# Patient Record
Sex: MALE | Race: WHITE
[De-identification: names, ages, dates, MRNs, and addresses within clinical notes are randomized per-mention and may not be internally consistent; named-entity substitution may affect disease eponyms.]

---

## 2017-03-19 ENCOUNTER — HOSPITAL ENCOUNTER (INPATIENT)
Dept: HOSPITAL 47 - EC | Age: 82
LOS: 2 days | Discharge: HOME | DRG: 69 | End: 2017-03-21
Payer: MEDICARE

## 2017-03-19 VITALS — BODY MASS INDEX: 32.8 KG/M2

## 2017-03-19 DIAGNOSIS — Z88.2: ICD-10-CM

## 2017-03-19 DIAGNOSIS — I71.4: ICD-10-CM

## 2017-03-19 DIAGNOSIS — Z79.01: ICD-10-CM

## 2017-03-19 DIAGNOSIS — E78.5: ICD-10-CM

## 2017-03-19 DIAGNOSIS — M19.90: ICD-10-CM

## 2017-03-19 DIAGNOSIS — Z87.891: ICD-10-CM

## 2017-03-19 DIAGNOSIS — F41.9: ICD-10-CM

## 2017-03-19 DIAGNOSIS — I10: ICD-10-CM

## 2017-03-19 DIAGNOSIS — Z79.899: ICD-10-CM

## 2017-03-19 DIAGNOSIS — Z82.49: ICD-10-CM

## 2017-03-19 DIAGNOSIS — H91.92: ICD-10-CM

## 2017-03-19 DIAGNOSIS — F03.90: ICD-10-CM

## 2017-03-19 DIAGNOSIS — Z86.73: ICD-10-CM

## 2017-03-19 DIAGNOSIS — I48.0: ICD-10-CM

## 2017-03-19 DIAGNOSIS — Z96.653: ICD-10-CM

## 2017-03-19 DIAGNOSIS — I44.0: ICD-10-CM

## 2017-03-19 DIAGNOSIS — G45.9: Primary | ICD-10-CM

## 2017-03-19 DIAGNOSIS — I25.10: ICD-10-CM

## 2017-03-19 DIAGNOSIS — Z79.82: ICD-10-CM

## 2017-03-19 DIAGNOSIS — R26.9: ICD-10-CM

## 2017-03-19 LAB
ALP SERPL-CCNC: 82 U/L (ref 38–126)
ALT SERPL-CCNC: 22 U/L (ref 21–72)
ANION GAP SERPL CALC-SCNC: 10 MMOL/L
APTT BLD: 28.8 SEC (ref 22–30)
AST SERPL-CCNC: 20 U/L (ref 17–59)
BUN SERPL-SCNC: 20 MG/DL (ref 9–20)
CALCIUM SPEC-MCNC: 9.4 MG/DL (ref 8.4–10.2)
CELLS COUNTED: 100
CH: 33.4
CHCM: 33.7
CHLORIDE SERPL-SCNC: 102 MMOL/L (ref 98–107)
CK SERPL-CCNC: 60 U/L (ref 55–170)
CO2 SERPL-SCNC: 30 MMOL/L (ref 22–30)
ERYTHROCYTE [DISTWIDTH] IN BLOOD BY AUTOMATED COUNT: 4.13 M/UL (ref 4.3–5.9)
ERYTHROCYTE [DISTWIDTH] IN BLOOD: 13.2 % (ref 11.5–15.5)
GLUCOSE SERPL-MCNC: 90 MG/DL (ref 74–99)
HCT VFR BLD AUTO: 41.1 % (ref 39–53)
HDW: 2.26
HGB BLD-MCNC: 13.8 GM/DL (ref 13–17.5)
INR PPP: 2.2 (ref ?–1.1)
MAGNESIUM SPEC-SCNC: 1.9 MG/DL (ref 1.6–2.3)
MCH RBC QN AUTO: 33.5 PG (ref 25–35)
MCHC RBC AUTO-ENTMCNC: 33.6 G/DL (ref 31–37)
MCV RBC AUTO: 99.5 FL (ref 80–100)
NON-AFRICAN AMERICAN GFR(MDRD): >60
POTASSIUM SERPL-SCNC: 3.8 MMOL/L (ref 3.5–5.1)
POTASSIUM SERPL-SCNC: 3.9 MMOL/L (ref 3.5–5.1)
PROT SERPL-MCNC: 7 G/DL (ref 6.3–8.2)
PT BLD: 21.3 SEC (ref 9–12)
SODIUM SERPL-SCNC: 142 MMOL/L (ref 137–145)
TROPONIN I SERPL-MCNC: <0.012 NG/ML (ref 0–0.03)
WBC # BLD AUTO: 5.9 K/UL (ref 3.8–10.6)
WBC (PEROX): 5.98

## 2017-03-19 PROCEDURE — 96360 HYDRATION IV INFUSION INIT: CPT

## 2017-03-19 PROCEDURE — 83735 ASSAY OF MAGNESIUM: CPT

## 2017-03-19 PROCEDURE — 93005 ELECTROCARDIOGRAM TRACING: CPT

## 2017-03-19 PROCEDURE — 83090 ASSAY OF HOMOCYSTEINE: CPT

## 2017-03-19 PROCEDURE — 82553 CREATINE MB FRACTION: CPT

## 2017-03-19 PROCEDURE — 96361 HYDRATE IV INFUSION ADD-ON: CPT

## 2017-03-19 PROCEDURE — 82550 ASSAY OF CK (CPK): CPT

## 2017-03-19 PROCEDURE — 93880 EXTRACRANIAL BILAT STUDY: CPT

## 2017-03-19 PROCEDURE — 84132 ASSAY OF SERUM POTASSIUM: CPT

## 2017-03-19 PROCEDURE — 85610 PROTHROMBIN TIME: CPT

## 2017-03-19 PROCEDURE — 84443 ASSAY THYROID STIM HORMONE: CPT

## 2017-03-19 PROCEDURE — 70450 CT HEAD/BRAIN W/O DYE: CPT

## 2017-03-19 PROCEDURE — 85730 THROMBOPLASTIN TIME PARTIAL: CPT

## 2017-03-19 PROCEDURE — 93306 TTE W/DOPPLER COMPLETE: CPT

## 2017-03-19 PROCEDURE — 71020: CPT

## 2017-03-19 PROCEDURE — 99285 EMERGENCY DEPT VISIT HI MDM: CPT

## 2017-03-19 PROCEDURE — 85025 COMPLETE CBC W/AUTO DIFF WBC: CPT

## 2017-03-19 PROCEDURE — 84484 ASSAY OF TROPONIN QUANT: CPT

## 2017-03-19 PROCEDURE — 36415 COLL VENOUS BLD VENIPUNCTURE: CPT

## 2017-03-19 PROCEDURE — 80053 COMPREHEN METABOLIC PANEL: CPT

## 2017-03-19 PROCEDURE — 80061 LIPID PANEL: CPT

## 2017-03-19 RX ADMIN — WARFARIN SODIUM SCH MG: 5 TABLET ORAL at 21:01

## 2017-03-19 RX ADMIN — CEFAZOLIN SCH MLS/HR: 330 INJECTION, POWDER, FOR SOLUTION INTRAMUSCULAR; INTRAVENOUS at 21:01

## 2017-03-19 RX ADMIN — ATORVASTATIN CALCIUM SCH MG: 20 TABLET, FILM COATED ORAL at 21:01

## 2017-03-19 RX ADMIN — CEFAZOLIN SCH: 330 INJECTION, POWDER, FOR SOLUTION INTRAMUSCULAR; INTRAVENOUS at 15:15

## 2017-03-19 NOTE — US
EXAMINATION TYPE: US carotid duplex BILAT

 

DATE OF EXAM: 3/19/2017 11:49 AM

 

COMPARISON: 8/2/2016

 

CLINICAL HISTORY: Stenosis. dizziness

 

EXAM MEASUREMENTS: 

 

RIGHT:  Peak Systolic Velocity (PSV) cm/sec

----- Right CCA:  73.5

----- Right ICA:   90.0     

----- Right ECA:  73.5   

ICA/CCA ratio:    1.2    

 

RIGHT:  End Diastole cm/sec

----- Right CCA:  19.7   

----- Right ICA:   19.7      

----- Right ECA:  15.3     

 

LEFT:  Peak Systolic Velocity (PSV) cm/sec

----- Left CCA:   87.8  

----- Left ICA:     68.8   

----- Left ECA:  133.9  

ICA/CCA ratio:   0.8  

 

LEFT:  End Diastole cm/sec

----- Left CCA:  16.4  

----- Left ICA:     8.6   

----- Left ECA:  17.6 

 

VERTEBRALS (direction of flow):

Right Vertebral: Antegrade

Left Vertebral: Antegrade

 

TECHNOLOGIST IMPRESSION:  No significant stenosis seen, bilateral plaque at bulb

 

Grayscale, color Doppler, spectral Doppler imaging performed of the carotid arteries.

 

IMPRESSION:  No hemodynamic significant stenosis of the proximal internal carotid arteries bilaterall
y by Doppler criteria, and indirect measurement of carotid stenosis.

## 2017-03-19 NOTE — XR
EXAMINATION TYPE: XR chest 2V

 

DATE OF EXAM: 3/19/2017 10:28 AM

 

COMPARISON: Prior chest x-ray 26th of August 2010

 

HISTORY: Altered mental status, abnormal chest x-ray

 

TECHNIQUE:  Frontal and lateral views of the chest are obtained.

 

FINDINGS:  There is no focal air space opacity, pleural effusion, or pneumothorax seen.  The cardiac 
silhouette size is stable and enlarged.  Overlying cardiac leads are present, there may be scoliosis.
 Postop changes are noted within the abdomen status post aortic stent graft. Prominent lung volume ma
y be indicative of COPD. The osseous structures are intact.

 

IMPRESSION:  No acute cardiopulmonary process. Cardiomegaly is stable.

## 2017-03-19 NOTE — ED
General Adult HPI





- General


Stated complaint: DIZZINESS


Time Seen by Provider: 03/19/17 09:26


Source: patient, RN notes reviewed


Mode of arrival: EMS


Limitations: no limitations





- History of Present Illness


Initial comments: 





Patient is a pleasant 86-year-old male presenting to emergency Department with 

feeling off balance.  Onset was this morning when driving to [place].  Symptoms 

continued while at [employer].  Patient feels somewhat better while lying in bed.  

Patient feels more off balance and leaning towards the side than anything else.

  Patient denies true sweating 6 sensation.  Patient states he may have had 

some minimal symptoms similar to this in the past.  No confusion.  No isolated 

area of weakness.





- Related Data


 Home Medications











 Medication  Instructions  Recorded  Confirmed


 


Hydrochlorothiazide [Hydrodiuril] 25 mg PO DAILY 11/02/15 11/23/16


 


Propafenone [Rythmol] 225 mg PO TID 11/02/15 11/23/16


 


Warfarin [Coumadin] 5 mg PO HS 11/02/15 11/23/16


 


Lisinopril [Zestril] 10 mg PO DAILY 11/23/16 11/23/16


 


Metoprolol Succinate (ER) [Toprol 25 mg PO DAILY 11/23/16 11/23/16





Xl]   








 Previous Rx's











 Medication  Instructions  Recorded


 


ALPRAZolam [Xanax] 0.25 mg PO TID PRN #15 tab 11/02/15











 Allergies











Allergy/AdvReac Type Severity Reaction Status Date / Time


 


Sulfa (Sulfonamide Allergy  Unknown Verified 11/23/16 19:13





Antibiotics)     














Review of Systems


ROS Statement: 


Those systems with pertinent positive or pertinent negative responses have been 

documented in the HPI.





ROS Other: All systems not noted in ROS Statement are negative.


Constitutional: Denies: fever


Eyes: Denies: eye pain


ENT: Denies: ear pain


Respiratory: Denies: cough


Cardiovascular: Denies: chest pain


Endocrine: Denies: fatigue


Gastrointestinal: Denies: abdominal pain


Genitourinary: Denies: dysuria


Musculoskeletal: Denies: back pain


Skin: Denies: rash


Neurological: Denies: weakness





Past Medical History


Past Medical History: Atrial Fibrillation, Coronary Artery Disease (CAD), 

Hypertension


Additional Past Medical History / Comment(s): AAA, deaf in left ear


History of Any Multi-Drug Resistant Organisms: None Reported


Past Surgical History: Heart Catheterization With Stent, Hernia Repair, 

Orthopedic Surgery


Past Psychological History: Anxiety


Smoking Status: Former smoker


Past Alcohol Use History: None Reported


Past Drug Use History: None Reported





General Exam


Limitations: no limitations


General appearance: alert, in no apparent distress


Head exam: Present: atraumatic


Eye exam: Present: normal appearance, PERRL, EOMI.  Absent: nystagmus


ENT exam: Present: normal oropharynx


Neck exam: Present: normal inspection


Respiratory exam: Present: normal lung sounds bilaterally


Cardiovascular Exam: Present: regular rate, normal rhythm


GI/Abdominal exam: Present: soft.  Absent: tenderness


Extremities exam: Present: normal inspection


Neurological exam: Present: alert, oriented X3, CN II-XII intact.  Absent: 

motor sensory deficit


  ** Expanded


Patient oriented to: Present: person, place, time


Speech: Present: fluid speech


Cranial nerves: EOM's Intact: Normal


Cerebellar function: Finger to Nose: Normal


Motor strength exam: RUE: 5, LUE: 5, RLE: 5, LLE: 5


Eye Response: (4) open spontaneously


Motor Response: (6) obeys commands


Verbal Response: (5) oriented


Psychiatric exam: Present: normal affect, normal mood


Skin exam: Absent: rash





Course


 Vital Signs











  03/19/17 03/19/17





  09:26 10:28


 


Temperature 98.0 F 


 


Pulse Rate 77 89


 


Respiratory 16 18





Rate  


 


Blood Pressure 156/77 137/65


 


O2 Sat by Pulse 97 96





Oximetry  














EKG Findings





- EKG Comments:


EKG Findings:: Junctional rhythm at 75.  .  .  .  Left 

axis.  LVH criteria.  No acute ST change.





Medical Decision Making





- Medical Decision Making





Patient reevaluated and still has some similar symptoms.  Patient and family 

updated on results and plan.  Case was discussed with Dr. Harris, who will 

admit for Dr. Wong.





- Lab Data


Result diagrams: 


 03/19/17 09:43





 03/19/17 09:43


 Lab Results











  03/19/17 03/19/17 03/19/17 Range/Units





  09:43 09:43 09:43 


 


WBC   5.9   (3.8-10.6)  k/uL


 


RBC   4.13 L   (4.30-5.90)  m/uL


 


Hgb   13.8   (13.0-17.5)  gm/dL


 


Hct   41.1   (39.0-53.0)  %


 


MCV   99.5   (80.0-100.0)  fL


 


MCH   33.5   (25.0-35.0)  pg


 


MCHC   33.6   (31.0-37.0)  g/dL


 


RDW   13.2   (11.5-15.5)  %


 


Plt Count   217   (150-450)  k/uL


 


Neutrophils % (Manual)   69.0   %


 


Lymphocytes % (Manual)   16.0   %


 


Monocytes % (Manual)   12.0   %


 


Eosinophils % (Manual)   3.0   %


 


Neutrophils # (Manual)   4.1   (1.3-7.7)  k/uL


 


Lymphocytes # (Manual)   0.9 L   (1.0-4.8)  k/uL


 


Monocytes # (Manual)   0.7   (0-1.0)  k/uL


 


Eosinophils # (Manual)   0.2   (0-0.7)  k/uL


 


Nucleated RBCs   0   (0-0)  /100 WBC


 


Manual Slide Review   Performed   


 


PT     (9.0-12.0)  sec


 


INR     (<1.1)  


 


APTT     (22.0-30.0)  sec


 


Sodium    142  (137-145)  mmol/L


 


Potassium    3.8  (3.5-5.1)  mmol/L


 


Chloride    102  ()  mmol/L


 


Carbon Dioxide    30  (22-30)  mmol/L


 


Anion Gap    10  mmol/L


 


BUN    20  (9-20)  mg/dL


 


Creatinine    1.07  (0.66-1.25)  mg/dL


 


Est GFR (MDRD) Af Amer    >60  (>60 ml/min/1.73 sqM)  


 


Est GFR (MDRD) Non-Af    >60  (>60 ml/min/1.73 sqM)  


 


Glucose    90  (74-99)  mg/dL


 


Calcium    9.4  (8.4-10.2)  mg/dL


 


Total Bilirubin    0.8  (0.2-1.3)  mg/dL


 


AST    20  (17-59)  U/L


 


ALT    22  (21-72)  U/L


 


Alkaline Phosphatase    82  ()  U/L


 


Total Creatine Kinase  60    ()  U/L


 


CK-MB (CK-2)  1.1    (0.0-2.4)  ng/mL


 


CK-MB (CK-2) Rel Index  1.8    


 


Troponin I  <0.012    (0.000-0.034)  ng/mL


 


Total Protein    7.0  (6.3-8.2)  g/dL


 


Albumin    4.0  (3.5-5.0)  g/dL














  03/19/17 Range/Units





  09:43 


 


WBC   (3.8-10.6)  k/uL


 


RBC   (4.30-5.90)  m/uL


 


Hgb   (13.0-17.5)  gm/dL


 


Hct   (39.0-53.0)  %


 


MCV   (80.0-100.0)  fL


 


MCH   (25.0-35.0)  pg


 


MCHC   (31.0-37.0)  g/dL


 


RDW   (11.5-15.5)  %


 


Plt Count   (150-450)  k/uL


 


Neutrophils % (Manual)   %


 


Lymphocytes % (Manual)   %


 


Monocytes % (Manual)   %


 


Eosinophils % (Manual)   %


 


Neutrophils # (Manual)   (1.3-7.7)  k/uL


 


Lymphocytes # (Manual)   (1.0-4.8)  k/uL


 


Monocytes # (Manual)   (0-1.0)  k/uL


 


Eosinophils # (Manual)   (0-0.7)  k/uL


 


Nucleated RBCs   (0-0)  /100 WBC


 


Manual Slide Review   


 


PT  21.3 H  (9.0-12.0)  sec


 


INR  2.2  (<1.1)  


 


APTT  28.8  (22.0-30.0)  sec


 


Sodium   (137-145)  mmol/L


 


Potassium   (3.5-5.1)  mmol/L


 


Chloride   ()  mmol/L


 


Carbon Dioxide   (22-30)  mmol/L


 


Anion Gap   mmol/L


 


BUN   (9-20)  mg/dL


 


Creatinine   (0.66-1.25)  mg/dL


 


Est GFR (MDRD) Af Amer   (>60 ml/min/1.73 sqM)  


 


Est GFR (MDRD) Non-Af   (>60 ml/min/1.73 sqM)  


 


Glucose   (74-99)  mg/dL


 


Calcium   (8.4-10.2)  mg/dL


 


Total Bilirubin   (0.2-1.3)  mg/dL


 


AST   (17-59)  U/L


 


ALT   (21-72)  U/L


 


Alkaline Phosphatase   ()  U/L


 


Total Creatine Kinase   ()  U/L


 


CK-MB (CK-2)   (0.0-2.4)  ng/mL


 


CK-MB (CK-2) Rel Index   


 


Troponin I   (0.000-0.034)  ng/mL


 


Total Protein   (6.3-8.2)  g/dL


 


Albumin   (3.5-5.0)  g/dL














- Radiology Data


Radiology results: report reviewed (Computed tomography scan the brain shows no 

acute abnormality.  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

  Age-related atrophy.), image reviewed (Two-view chest x-ray shows 

cardiomegaly.  No acute process.)





Disposition


Clinical Impression: 


 Balance problem





Disposition: ADMITTED AS IP TO THIS HOSP

## 2017-03-19 NOTE — CT
EXAMINATION TYPE: CT brain wo con

 

DATE OF EXAM: 3/19/2017 10:15 AM

 

COMPARISON: NONE

 

HISTORY: Neuro deficits.  Dizziness.

 

CT DLP: 1029.90 mGycm

Automated exposure control for dose reduction was used.

 

FINDINGS: 

There is no acute intracranial hemorrhage, mass effect, or midline shift identified.  The ventricles 
and sulci are within normal limits in size. Cerebral vascular calcifications are present. Periventric
ular white matter low-attenuation is present, there is cortical atrophy. Low-attenuation in the poste
rior parietal lobe on the right likely due to old infarction. The globes are intact and the visualize
d sinuses are remarkable for inflammatory change in the ethmoid air cells, sphenoid sinus.

 

IMPRESSION: 

No acute abnormalities suspected. 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 Age
-related atrophy. MRI may be of benefit as indicated

## 2017-03-20 VITALS — RESPIRATION RATE: 18 BRPM

## 2017-03-20 RX ADMIN — CEFAZOLIN SCH MLS/HR: 330 INJECTION, POWDER, FOR SOLUTION INTRAMUSCULAR; INTRAVENOUS at 16:53

## 2017-03-20 RX ADMIN — LISINOPRIL SCH MG: 10 TABLET ORAL at 10:32

## 2017-03-20 RX ADMIN — WARFARIN SODIUM SCH MG: 5 TABLET ORAL at 20:25

## 2017-03-20 RX ADMIN — CEFAZOLIN SCH MLS/HR: 330 INJECTION, POWDER, FOR SOLUTION INTRAMUSCULAR; INTRAVENOUS at 06:56

## 2017-03-20 RX ADMIN — ATORVASTATIN CALCIUM SCH MG: 20 TABLET, FILM COATED ORAL at 20:25

## 2017-03-20 NOTE — ECHOF
Referral Reason:



MEASUREMENTS

--------

HEIGHT: 175.3 cm

WEIGHT: 100.2 kg

BP: 

IVSd:   1.3 cm     (0.6 - 1.1)

LVIDd:   4.6 cm     (3.9 - 5.3)

LVPWd:   1.3 cm     (0.6 - 1.1)

IVSs:   2.3 cm

LVIDs:   2.4 cm

LVPWs:   2.0 cm

Ao Diam:   4.0 cm     (2.0 - 3.7)

AV Cusp:   1.4 cm     (1.5 - 2.6)

LA Diam:   3.8 cm     (2.7 - 3.8)

MV EXCURSION:   12.495 mm     (> 18.000)

MV EF SLOPE:   133 mm/s     (70 - 150)

EPSS:   1.0 cm

MV E Kilo:   0.76 m/s

MV DecT:   290 ms

MV A Kilo:   0.66 m/s

MV E/A Ratio:   1.14 

AV maxP.98 mmHg

AV meanP.12 mmHg

AR PHT:   419 ms

RAP:   5.00 mmHg

RVSP:   10.82 mmHg







FINDINGS

--------

Sinus rhythm.

This was a technically adequate study.

There is mild concentric left ventricular hypertrophy.  

 Overall left ventricular systolic function is normal 

with, an EF between 55 - 60 %.

The right ventricle is normal in size and function.

The left atrium is normal in size.

The right atrium is normal in size.

Aortic valve is trileaflet and is moderately thickened. 

  There is mild aortic regurgitation.   There is mild 

aortic stenosis present.   Peak/mean gradient across 

the Aortic Valve is 16.98mmHg / 9.12mmHg.

The mitral valve leaflets are mildly thickened.   There 

is trace mitral regurgitation.

Trace tricuspid regurgitation present.   The right 

ventricular systolic pressure, as measured by Doppler, 

is 10.82mmHg.

Pulmonic valve appears structurally normal.

The aortic root is mildy dilated.

The pericardium is normal.



CONCLUSIONS

--------

1. Sinus rhythm.

2. There is mild aortic stenosis present.

3. Peak/mean gradient across the Aortic Valve is 16.98mmHg 

/ 9.12mmHg.

4. The mitral valve leaflets are mildly thickened.

5. There is trace mitral regurgitation.

6. Trace tricuspid regurgitation present.

7. The right ventricular systolic pressure, as measured by 

Doppler, is 10.82mmHg.

8. Pulmonic valve appears structurally normal.

9. The aortic root is mildy dilated.

10. The pericardium is normal.

11. This was a technically adequate study.

12. There is mild concentric left ventricular hypertrophy.

13. Overall left ventricular systolic function is normal 

with, an EF between 55 - 60 %.

14. The right ventricle is normal in size and function.

15. The left atrium is normal in size.

16. The right atrium is normal in size.

17. Aortic valve is trileaflet and is moderately thickened.

18. There is mild aortic regurgitation.





SONOGRAPHER: Cheri Chappell RDCS

## 2017-03-20 NOTE — CONS
DATE OF CONSULTATION:  



CHIEF COMPLAINT:  Near syncope. 



This is an 86-year-old gentleman with history of severe dementia, 

paroxysmal atrial fibrillation, hypertension, and dyslipidemia who 

presented to the hospital having had an episode of profound dizziness 

and near syncope while he was at [facility].  He felt dizzy.  Got up.  

Held onto something but did not lose consciousness, did not pass out 

and did not fall. Came to hospital and since being admitted he was 

initially in sinus rhythm. Subsequently, he was in junctional rhythm. 

There were episodes of atrial fibrillation.  He has had pauses, the 

longest of which was about 4.3 seconds.  Interestingly the patient did 

not have any symptoms during the pauses and it is unclear as to what 

exactly caused a near syncopal event. At the time of my evaluation, he 

is free of symptoms. Denies chest pain, difficulty in breathing, 

palpitations or focal neurological deficits. Patient complains of 

having some visual disturbances on the right side and is currently 

being worked up for possible cerebrovascular accident. The patient is 

on multiple medications that could be causing the pauses including the 

Toprol-XL, which is currently on hold and Rythmol, which is currently 

held.  The patient is adequately anticoagulated with an INR of 2.2. 

Patient has significant dementia. Family does not want to have any 

invasive procedures unless it is absolutely necessary.   I believe the 

pauses are related to his underlying atrial fibrillation and if he 

develops pauses more than five seconds, behaves like tachybrady 

syndrome off the beta blockers, then we will consider pacemaker on 

him.  At this time,  I am going to treat him with his medications, 

optimal control of blood pressure, continued anticoagulation and check 

an echocardiogram. I will review his outpatient records and I am going 

to talk to Dr. ESTUARDO Ward his primary cardiologist.  



Past medical history is significant for paroxysmal atrial 

fibrillation, hypertension, dyslipidemia.  



Medications at home include:

1. Coumadin 5 daily.

2. Rythmol 225 t.i.d. 

3. Toprol-XL 25 daily.

4. Zestril 20 daily.

5. HydroDIURIL.

6. Atorvastatin.

7. Aspirin.



ALLERGIC TO SULFA. 



Family history is negative for premature coronary artery disease. 



SOCIAL HISTORY: Negative for smoking, ETOH abuse, or drug abuse. The 

patient has dementia, wife is a caregiver.  



REVIEW OF SYSTEMS:

HEENT: Unremarkable. 

CARDIAC: As described above. 

RESPIRATORY: As described above. 

GI: Negative. 

GENITOURINARY: Negative. Allergy/immunology: Negative. 

SKIN: Negative. 

MUSCULOSKELETAL: Significant for arthritis. 

PSYCHOSOCIAL: Negative. Dermatology: Negative. 

CONSTITUTIONAL: Negative. Oncological: Negative. 

HEMATOLOGICAL: Negative. 



The rest of the system review is not relevant. 



On exam, comfortable at rest, afebrile. Heart rate is 55 beats per 

minute, blood pressure is 101/55, respirations 18, O2 sat is 96% on 

room air. There is no jugular venous distention. Carotid upstroke is 

normal. There is no bruit. Chest exam reveals good air entry 

bilaterally. Heart exam reveals first and second heart sounds. No 

gallop. No murmur, no rub.  

ABDOMEN: Soft, nontender. Exam of the extremities did not reveal any 

edema. Peripheral pulses are felt. CNS exam did not reveal focal 

neurological deficits.  



Labs show a hemoglobin of 13.8, platelet count is 217. INR is 2.2.  

The potassium is 3.9. Creatinine is 1.  One set of troponin is 

negative TSH is normal at 2.6.  



ASSESSMENT:

1. Near syncope. 

2. Paroxysmal atrial fibrillation. 

3. Sinus pauses. 

4. Dyslipidemia. 



PLAN: I am going to hold the beta blockers at this stage.  Continue 

anticoagulation.  Hold Rythmol.  See how he does.  I will review her 

outpatient records.

## 2017-03-20 NOTE — HP
DATE OF SERVICE:  3/19/2017





CHIEF COMPLAINT: Dizziness and balance issues.



HISTORY OF PRESENT ILLNESS: Mr. Mckee is an 86-year-old male with 

the past medical history of atrial fibrillation, coronary artery 

disease, hypertension and dementia. Brought in by his family members 

with dizziness and also was having a problem with his balance since 

this morning. Patient was driving to [place] this morning when he felt 

dizzy and then he reached the [place] and while he was in the [place] 

the patient felt that his balance was off and was leaning to his left 

side.  His family also noticed that the patient was having twitching 

on the right side of his face. There was no loss of consciousness or 

no seizure-like activity. Patient denies having weakness.  He denies 

having any headaches, blurring of vision or problems with hearing. 

Patient is hard of hearing but no new symptoms. Patient denies feeling 

sick. Denies having any fever, neck pain or headaches.  



REVIEW OF SYSTEMS:

CONSTITUTIONAL: Denies fever, chills or rigors.

CHEST:  No cough. No difficulty in breathing. 

CARDIAC: No chest pain, no palpitations. 

GI: No abdominal pain, nausea, vomiting, or diarrhea. 

: No dysuria or hematuria.

HEMATOLOGIC:  No history for recurrent infections or easy bruising. 

DERMATOLOGICAL: None.

All 13 review of systems are done and normal except for ones mentioned 

in the HPI.  



PAST MEDICAL HISTORY: Significant for atrial fibrillation, coronary 

artery disease, hypertension, dementia and deafness in the left.  



PAST SURGICAL HISTORY: Hernia repair, orthopedic surgery, bilateral 

knee replacements.  



ALLERGIES: Sulfa drugs. 



SOCIAL HISTORY: Former smoker and quit smoking 40 to 50 years back. 

Occasional alcohol use. No history of intravenous drug abuse.  



Patient's home medications:  Coumadin 5 mg p.o. q.h.s. propafenone 225 

mg p.o. 3 times a day. Hydrochlorothiazide 25 mg p.o. daily, 

metoprolol XL 25 mg p.o. daily, omeprazole 20 mg p.o. daily, 

atorvastatin 20 mg p.o. q.h.s. aspirin 81 mg p.o. q.h.s.  



FAMILY HISTORY:  Positive coronary artery disease.



On examination, patient's vital signs, temperature 97.1, heart rate 

65, respiratory rate 16, blood pressure 176/97, saturating at 94% on 

room air.  



GENERAL EXAMINATION: Patient is an elderly male, lying in bed, appears 

to be in no acute distress.  

HEAD: Atraumatic, normocephalic. 

EYES: Pupils, round, and reactive to light. 

NECK: No JVD. No thyromegaly. 

CARDIOVASCULAR: S1, S2 heard, bradycardic. 

LUNGS: Bilateral breath sounds are positive. No wheezes or crackles. 

ABDOMEN: Soft, nontender, nondistended. Organomegaly difficult to 

appreciate due to huge body habitus.  

EXTREMITIES: No edema, o cyanosis, no clubbing. 

CNS: Alert, awake, oriented x3. No focal neurological deficits, no 

facial weakness.  Strength is 4 out of 5 in all 4 extremities.  Gait 

is normal.   

PSYCHOLOGICAL:  Appropriate mood and affect.

SKIN:  No rashes.



Patient's labs: White count of 5.9, hemoglobin is 13.8, platelets of 

217.  Sodium 142, potassium 3.8, chloride 102, bicarb 30, BUN 20, 

creatinine 1.7. PT of 21.3, INR of 2.2.  Albumin is 4, total protein 

is 7, troponin less than 0.012.  



ASSESSMENT AND PLAN:

1.  Dizziness with balance issues. The patient has been admitted to be 

evaluated for transient ischemic attack/stroke.  Currently, the 

patient does not have any neurological deficits. Neurology has been 

consulted. Patient also had a CAT scan of his brain that is within 

normal limits for his age.  Patient has been put on tele monitoring 

and the patient is having 3 to 4 second sinus pauses with first degree 

heart block.  His anti-arrthmics and B blockers have been on hold currently.  

2. History of atrial fibrillation. He is on Coumadin. 

3. History of coronary artery disease. 

4. Hearing loss in left ear. 

5. Dementia. 

6. History of bilateral knee replacements done in the past. 

7. History of anxiety. 



PLAN: The plan is to continue the patient on the current medication 

regimen. Neurology has been consulted.  Further recommendations to 

follow depending on the progress of the patient. 



BASILD

## 2017-03-21 VITALS — HEART RATE: 63 BPM | DIASTOLIC BLOOD PRESSURE: 63 MMHG | SYSTOLIC BLOOD PRESSURE: 134 MMHG | TEMPERATURE: 98.1 F

## 2017-03-21 LAB
CHOLEST SERPL-MCNC: 77 MG/DL (ref ?–200)
HDLC SERPL-MCNC: 50 MG/DL (ref 40–60)
TRIGL SERPL-MCNC: 64 MG/DL (ref ?–150)

## 2017-03-21 RX ADMIN — LISINOPRIL SCH MG: 10 TABLET ORAL at 08:35

## 2017-03-21 RX ADMIN — CEFAZOLIN SCH: 330 INJECTION, POWDER, FOR SOLUTION INTRAMUSCULAR; INTRAVENOUS at 13:36

## 2017-03-21 RX ADMIN — CEFAZOLIN SCH MLS/HR: 330 INJECTION, POWDER, FOR SOLUTION INTRAMUSCULAR; INTRAVENOUS at 04:17

## 2017-03-21 NOTE — CONS
DATE OF CONSULTATION:  03/20/2017



CHIEF COMPLAINT:  Dizziness and gait instability. 



HISTORY OF PRESENT ILLNESS: The patient is a pleasant, 86-year-old 

 male who is being evaluated by the neurology service per the 

request of Dr. Slaughter for the above-mentioned complaints. The patient 

was brought into [facility] emergency room yesterday after he 

had some dizziness and gait instability. The patient was driving his 

[employer]ism and remembers having dizziness while driving. He did make it to 

the [employer]ism but he was noticed to be having gait instability with 

difficulty balancing. There was also mention of some left facial 

twitching on the right side. In the emergency room, a CT scan of the 

brain was done, which showed no acute intracranial abnormalities. 

There was evidence of an old ischemic infarct involving the right 

parietal lobe along with some generalized atrophy and small vessel 

ischemic changes. The patient does have history of atrial fibrillation 

and is on Coumadin and aspirin 81 mg daily at home. His INR was 

therapeutic at 2.2 on arrival. A carotid Doppler was done, which 

showed no hemodynamically significant stenosis. His CBC, comprehensive 

metabolic profile, and cardiac enzymes were reviewed and were within 

normal limits. At the time of my evaluation, the patient is lying in 

his bed and appears to be in no acute distress. He reports resolution 

of his symptoms. His family reports that they have been ambulating him 

up and down the hallway and he is back to his baseline.  



PAST MEDICAL HISTORY: Atrial fibrillation, coronary artery disease, 

hypertension, dementia.  



PAST SURGICAL HISTORY: Hernia repair, bilateral knee replacement 

surgeries.  



SOCIAL HISTORY: The patient is a former smoker. He occasionally drinks 

alcohol. He denies any drug use.  



FAMILY HISTORY: Positive for heart disease. 



HOME MEDICATIONS: Reviewed in the chart. 



ALLERGIES: SULFA DRUGS. 



REVIEW OF SYSTEMS:

CONSTITUTIONAL: Negative. 

EYES: Positive for chronic diminished vision. 

ENT: Positive for chronic diminished hearing, left more than right. 

CARDIOVASCULAR: Positive for coronary artery disease and atrial 

fibrillation.  

RESPIRATORY: Negative. 

NEUROLOGICAL: As mentioned above. He denies any lateralizing numbness 

or weakness. The patient also has progressive memory loss.  

GASTROINTESTINAL: Negative. 

GENITOURINARY: Negative. 

PSYCHIATRIC: Negative. 

MUSCULOSKELETAL: Positive for occasional joint pain. 

ENDOCRINE: Negative. Dermatological: Negative. 



PHYSICAL EXAM: Vital signs show a temperature of 98.4, pulse 64, 

respirations 18, blood pressure 154/94.  



GENERAL APPEARANCE: The patient is a mildly obese, elderly  

male who appears to be in no acute distress.  

HEENT: Normocephalic, atraumatic, no facial asymmetry is seen. Neck is 

supple with no masses felt.  

CARDIOVASCULAR: Regular rate and rhythm. 

ABDOMEN: Nontender, nondistended. Extremities showed trace edema with 

no clubbing seen.  



NEUROLOGICAL EXAM: The patient is awake and oriented to person and 

place. He could not recall the year. No lateralizing weakness is seen 

but the patient does have mild generalized weakness at 5-/5 rating. 

Sensory exam showed normal light-touch sensation in all 4 extremities. 

No facial asymmetry is seen on cranial nerve testing. Mild postural 

tremors are seen. No seizure-like activity is noticed.  



IMPRESSION:

1. Transient ischemic attack. 

2. Transient episode of dizziness and gait instability, resolved. 

3. History of previous ischemic stroke. 

4. Atrial fibrillation. 

5. Progressive memory loss. 



RECOMMENDATIONS: The patient does appear to have suffered a transient 

ischemic attack with a transient episode of gait instability and 

dizziness. He does have a history of atrial fibrillation and is on 

Coumadin and aspirin. His INR is therapeutic and I do recommend 

continuing anticoagulation at the current dose. His carotid Doppler 

showed no hemodynamically significant stenosis. His CT scan of the 

brain did show an old ischemic stroke involving the right parietal 

lobe. The patient and the family were unaware of this. His risk 

factors for strokes were discussed with the patient and his family. I 

will order a fasting lipid panel, EEG and serum homocysteine level. As 

for his progressive memory loss, loss further outpatient neurological 

work-up will be needed.  He will follow up in the clinic after his 

discharge. Continue the rest of your current work-up and management. I 

will continue to follow with you. Further recommendations to follow.  



Thank you for allowing me to participate in the care of your patient. 

If you have any questions, please feel free to contact me.

## 2017-03-21 NOTE — PN
DATE OF SERVICE:  03/20/2017



CHIEF COMPLAINT: Dizziness and balance issues. 



INTERVAL HISTORY: Mr. Mckee is an 86-year-old male with a past 

medical history of atrial fibrillation, coronary artery disease, 

hypertension, dementia who is brought in by his family members with a 

chief complaint of dizziness and was also having bowel problems with 

his balance. Currently, the patient does not have any active symptoms 

with which he presented. Patient was also having longer 4 to 5 second 

pauses and so his beta blockers and Rythmol have been discontinued 

yesterday. He is currently being worked up for TIA/stroke. (      ) 

The patient has no active complaints. He is sitting in his bed, 

appears to be no acute distress.  



REVIEW OF SYSTEMS:    

CONSTITUTIONAL: Denies having any fever, chills or rigors. 

RESPIRATORY: No cough. No difficulty in breathing. 

GI: No abdominal pain, nausea, vomiting, or diarrhea. 

: No dysuria or hematuria. 

Patient has dementia at baseline. 



Patient's medications have been reviewed. 



On examination, patient's vital signs: Temperature 98.2, heart rate 

73, respiratory rate 18, blood pressure 168/96, saturating at 98% on 

room air.  

GENERAL EXAMINATION: Patient appears to be in no acute distress, 

sitting comfortably in the (      ) having  conversation with his 

family members.  

HEAD:  Atraumatic, normocephalic. 

EYES:  Pupils round and reactive to light. No facial droop noticed. 

NECK: No JVD. No thyromegaly. 

CARDIOVASCULAR: S1, S2 heard. Bradycardiac. 

LUNGS: Bilateral breath sounds are positive. No wheeze or crackles. 

ABDOMEN: Soft, nontender, nondistended. Organomegaly difficult to 

appreciate due to huge body habitus.  

EXTREMITIES: No edema, no cyanosis, no clubbing. 

CNS:  Alert, awake, oriented x3. No focal neurological deficits. 

Strength is 4 out of 5 in all 4 extremities. Gait is normal.  

PSYCHOLOGICAL:  Appropriate mood and affect. 

SKIN: No rashes. 



THE PATIENT'S LABS: No new labs from this morning. Labs from yesterday 

within normal limits.  



ASSESSMENT AND PLAN: 

1. Dizziness with balance issues.  The patient has been admitted for 

transient ischemic attack evaluation. Currently, he does not have any 

neurological deficits. The patient had a CAT scan of his brain and 

also carotid artery Doppler and an echocardiogram done, which are 

within normal limits. As the patient had sinus pauses, his beta 

blockers and antiarrhythmics have been on hold currently.  

2. History of atrial fibrillation, on anticoagulation with Coumadin. 

3. History of coronary artery disease. 

4. Hearing loss, left ear. 

5. Dementia. 

6. History of bilateral knee replacement done in the past. 

7. History of anxiety. 



PLAN: The plan is to continue the patient on current medication 

regimen. Neurology has been consulted and are pending their 

evaluation. Further recommendations to follow depending on the 

progress of the patient.

## 2017-03-21 NOTE — P.PN
Subjective


Principal diagnosis: 





TIA





Is a pleasant 86-year-old male continuing to be evaluated by the neurology 

service.  He had been experiencing some dizziness and gait instability.  There 

was also a mention of some right facial twitching.  Initial CT of the brain 

showed no acute intracranial abnormalities.  There was some old ischemic change 

noted in the right parietal lobe.  There was also generalized atrophy and small 

vessel ischemic changes.  He has a known history of atrial fibrillation and is 

on Coumadin and aspirin.  Carotid Doppler showed no hemodynamically snug 

against stenosis.  At this time my evaluation he is walking unaided in the 

hallway.  Note he does have a diagnosis of likely dementia but is on no 

medication for this.  His wife does report that he has trouble sleeping she is 

not at his home so they're asking if he could possibly go home today.





Objective





- Vital Signs


Vital signs: 


 Vital Signs











Temp  98.1 F   03/21/17 15:51


 


Pulse  63   03/21/17 15:51


 


Resp  18   03/21/17 15:51


 


BP  134/63   03/21/17 15:51


 


Pulse Ox  96   03/21/17 15:51








 Intake & Output











 03/20/17 03/21/17 03/21/17





 18:59 06:59 18:59


 


Intake Total  160 702


 


Output Total  200 


 


Balance  -40 702


 


Weight  101.1 kg 


 


Intake:   


 


  Intake, IV Titration  160 





  Amount   


 


    Sodium Chloride 0.9% 1,  160 





    000 ml @ 100 mls/hr IV .   





    Q10H Pending sale to [facility] Rx#:[id number]   


 


  Oral   702


 


Output:   


 


  Urine  200 


 


Other:   


 


  Voiding Method Toilet Toilet Toilet


 


  # Voids 1  














- Constitutional


General appearance: Present: average body habitus, cooperative, no acute 

distress





- EENT


Eyes: Present: PERRLA.  Absent: abnormal pupil, ptosis


ENT: Present: hard of hearing





- Neck


Neck: Present: normal ROM





- Respiratory


Respiratory: negative: prolonged expiration, prolonged inspiration





- Cardiovascular


Rhythm: irregularly irregular





- Gastrointestinal


General gastrointestinal: Absent: distended, tenderness





- Neurologic


Neurologic Comment(s): 





Patient is alert awake and oriented to person and place.  There is no 

lateralizing weakness seen .  There is no facial asymmetry.  No sensory deficit 

in any extremity.  No tremors or seizure-like activities are seen except for 

some mild postural tremors in the hands.





- Labs


CBC & Chem 7: 


 03/19/17 09:43





 03/19/17 19:19





Assessment and Plan


(1) TIA (transient ischemic attack)


Status: Acute   





(2) Atrial fibrillation


Status: Chronic   





(3) Memory loss


Status: Chronic   





(4) History of stroke


Status: Chronic   





(5) Balance problem


Status: Resolved   


Plan: 





He likely has suffered a transient ischemic attack.  He will continue Coumadin 

and aspirin.  His carotid Doppler showed no hemodynamically significant 

stenosis and his CT of the brain did confirm an old stroke of the right 

parietal lobe.  An EEG has not been performed yet and they're asking if they 

can get this done in outpatient setting.  I will talk to nursing staff aphasic 

cannot be done tonight we will do this in an outpatient setting.  We will also 

follow him if they wish to workup his likely diagnosis of dementia.  He is 

otherwise cleared from a neurological standpoint.  Continue evaluation and 

treatment by cardiology.





I have performed a history and physical on the above patient.  I have reviewed 

the above note, and agree.

## 2017-03-21 NOTE — P.PN
Subjective


Principal diagnosis: 


Near Syncope





This is a pleasant 86-year-old gentleman with a history of severe dementia who 

follows regularly in the office with Dr. LAWSON Ward.  He has a known history of 

paroxysmal atrial fib, hypertension and dyslipidemia.  Presented to the 

hospital via EMS after having episodes of profound dizziness and near syncope 

while at [facility].  Upon presentation patient was in a sinus rhythm.  

Subsequently he was found to be in a junctional rhythm with episodes of atrial 

fibrillation.  He was noted to have positive with the longest of about 4.3 

seconds.  The patient's Toprol-XL and Rythmol are currently on hold. on 

examination this morning, patient verbalizes feeling quite a bit better.  He 

does complain of feeling tired and attributes this to not sleeping well 

initially while here.  Said he did sleep quite a bit better last night.  His 

wife is at his bedside.  He denies any complaints of dizziness or 

lightheadedness and his wife confirms this.








Objective





- Vital Signs


Vital signs: 


 Vital Signs











Temp  98.2 F   03/21/17 08:38


 


Pulse  72   03/21/17 08:38


 


Resp  18   03/21/17 08:38


 


BP  153/71   03/21/17 08:38


 


Pulse Ox  97   03/21/17 08:38








 Intake & Output











 03/20/17 03/21/17 03/21/17





 18:59 06:59 18:59


 


Intake Total  160 240


 


Output Total  200 


 


Balance  -40 240


 


Weight  101.1 kg 


 


Intake:   


 


  Intake, IV Titration  160 





  Amount   


 


    Sodium Chloride 0.9% 1,  160 





    000 ml @ 100 mls/hr IV .   





    Q10H [facility] Rx#:[id number]   


 


  Oral   240


 


Output:   


 


  Urine  200 


 


Other:   


 


  Voiding Method Toilet Toilet Toilet


 


  # Voids 1  














- Exam


PHYSICAL EXAMINATION: 





HEENT: Head is atraumatic, normocephalic.  Pupils equal, round.  Neck is 

supple.  There is no elevated jugular venous pressure.





HEART EXAMINATION: Heart sounds regular, S1 and S2 with a systolic murmur.





CHEST EXAMINATION: Lungs are clear to auscultation and precussion. No chest 

wall tenderness is noted on palpation or with deep breathing.





ABDOMEN:  Soft, nontender. Bowel sounds are heard. No organomegaly noted.


 


EXTREMITIES: Diminished peripheral pulses with no evidence of peripheral edema 

and no calf tenderness noted.





NEUROLOGIC patient is awake, alert and oriented to person and place, frequent 

confusion and short-term memory loss noted.


 


.


 











- Labs


CBC & Chem 7: 


 03/19/17 09:43





 03/19/17 19:19





Assessment and Plan


Plan: 


Assessment and plan





#1 near syncope


#2 paroxysmal atrial fibrillation, anticoagulated on Coumadin


#3 sinus pauses


#4 dyslipidemia


#5 dementia





Cardiologist perspective, we will continue to hold beta blockers and Rythmol.  

Continue anticoagulation.  Further recommendations to follow.





The above dictated assessment and findings were discussed with signing 

physician. The impression and plan of care have been directed as dictated. 

Lela Strauss, Nurse Practitioner, acting as scribe for signing physician.

## 2017-03-23 NOTE — DS
DATE OF ADMISSION:   03/20/2017

DATE OF DISCHARGE:   03/21/2017



DISCHARGE DIAGNOSES: 

1. Dizziness and near syncope, discontinued metoprolol and 

propafenone/Rythmol.  

2. Possible transient ischemic attack.  Neurologic workup included 

carotid duplex, CT head and 2-D echo have been negative.  

3. History of paroxysmal atrial fibrillation, on anticoagulation with 

Coumadin.  

4. History of coronary artery disease. 

5. Hearing loss left ear. 

6. Dementia and memory impairment. 

7. Osteoarthritis with bilateral knee replacement in the past. 

8. Anxiety. 

9. Hyperlipidemia. 



HOSPITAL COURSE: Mr. Mckee is an 86-year-old male with known 

history of underlying dementia and memory (      ) admitted to the 

hospital with a near syncopal episode. The patient had workup done for 

possible TIA including CT head, carotid duplex and 2-D echo has been 

negative. Patient was seen by Cardiology and Neurology. Patient has 

been taking beta blockers and Rythmol for paroxysmal atrial 

fibrillation, which has been discontinued. Otherwise, patient 

symptomatically much improved now. Neurology recommended outpatient 

EEG. Workup for seizures. Otherwise, the patient is clinically stable 

and will be discharged home with the family in stable condition.  



DISCHARGE PHYSICAL EXAMINATION: An 86-year-old male sitting on the 

chair comfortably, awake, alert, oriented x2 to 3, appears to be in no 

apparent distress.  

VITALS: Blood pressure 134/63, pulse is 63, respiratory rate is 18, 

temperature afebrile. Pulse ox 96% on room air.  

Laboratory data reviewed. 



Discharge physical examination done. 



Discharge medications include: 

1. Hydrochlorothiazide 25 mg p.o. daily. 

2. Warfarin 5 mg p.o. at bedtime. 

3. Aspirin 81 mg p.o. at bedtime. 

4. Atorvastatin 20 mg p.o. at bedtime. 

5. Zestril 20 mg p.o. daily. 



Patient will be discharged home and home with self care and by the 

family members.  Activity as tolerated. Heart healthy diet.  



Follow with Dr. Chandu Wong in 1 to 2 days. Follow with Dr. Margi Sprague in 10 days.

## 2017-09-12 ENCOUNTER — HOSPITAL ENCOUNTER (EMERGENCY)
Dept: HOSPITAL 47 - EC | Age: 82
Discharge: HOME | End: 2017-09-12
Payer: MEDICARE

## 2017-09-12 VITALS — SYSTOLIC BLOOD PRESSURE: 150 MMHG | TEMPERATURE: 97.8 F | DIASTOLIC BLOOD PRESSURE: 73 MMHG | HEART RATE: 56 BPM

## 2017-09-12 VITALS — RESPIRATION RATE: 18 BRPM

## 2017-09-12 DIAGNOSIS — Z88.2: ICD-10-CM

## 2017-09-12 DIAGNOSIS — K59.00: Primary | ICD-10-CM

## 2017-09-12 DIAGNOSIS — Z79.01: ICD-10-CM

## 2017-09-12 DIAGNOSIS — I25.10: ICD-10-CM

## 2017-09-12 DIAGNOSIS — Z87.891: ICD-10-CM

## 2017-09-12 DIAGNOSIS — Z79.899: ICD-10-CM

## 2017-09-12 DIAGNOSIS — I48.91: ICD-10-CM

## 2017-09-12 DIAGNOSIS — F41.9: ICD-10-CM

## 2017-09-12 DIAGNOSIS — I10: ICD-10-CM

## 2017-09-12 DIAGNOSIS — F03.90: ICD-10-CM

## 2017-09-12 DIAGNOSIS — Z79.82: ICD-10-CM

## 2017-09-12 DIAGNOSIS — E86.0: ICD-10-CM

## 2017-09-12 LAB
ALP SERPL-CCNC: 97 U/L (ref 38–126)
ALT SERPL-CCNC: 31 U/L (ref 21–72)
AMYLASE SERPL-CCNC: 63 U/L (ref 30–110)
ANION GAP SERPL CALC-SCNC: 8 MMOL/L
AST SERPL-CCNC: 21 U/L (ref 17–59)
BUN SERPL-SCNC: 17 MG/DL (ref 9–20)
CALCIUM SPEC-MCNC: 9.1 MG/DL (ref 8.4–10.2)
CELLS COUNTED: 100
CH: 34.1
CHCM: 34.7
CHLORIDE SERPL-SCNC: 103 MMOL/L (ref 98–107)
CK SERPL-CCNC: 67 U/L (ref 55–170)
CO2 SERPL-SCNC: 28 MMOL/L (ref 22–30)
ERYTHROCYTE [DISTWIDTH] IN BLOOD BY AUTOMATED COUNT: 3.84 M/UL (ref 4.3–5.9)
ERYTHROCYTE [DISTWIDTH] IN BLOOD: 13.5 % (ref 11.5–15.5)
GLUCOSE SERPL-MCNC: 83 MG/DL (ref 74–99)
HCT VFR BLD AUTO: 38 % (ref 39–53)
HDW: 2.27
HGB BLD-MCNC: 12.9 GM/DL (ref 13–17.5)
MCH RBC QN AUTO: 33.6 PG (ref 25–35)
MCHC RBC AUTO-ENTMCNC: 34 G/DL (ref 31–37)
MCV RBC AUTO: 98.8 FL (ref 80–100)
NON-AFRICAN AMERICAN GFR(MDRD): >60
PH UR: 6.5 [PH] (ref 5–8)
POTASSIUM SERPL-SCNC: 3.8 MMOL/L (ref 3.5–5.1)
PROT SERPL-MCNC: 6.6 G/DL (ref 6.3–8.2)
SODIUM SERPL-SCNC: 139 MMOL/L (ref 137–145)
SP GR UR: 1 (ref 1–1.03)
TROPONIN I SERPL-MCNC: <0.012 NG/ML (ref 0–0.03)
UA BILLING (MACRO VS. MICRO): (no result)
UROBILINOGEN UR QL STRIP: <2 MG/DL (ref ?–2)
WBC # BLD AUTO: 7 K/UL (ref 3.8–10.6)
WBC (PEROX): 6.89

## 2017-09-12 PROCEDURE — 80053 COMPREHEN METABOLIC PANEL: CPT

## 2017-09-12 PROCEDURE — 36415 COLL VENOUS BLD VENIPUNCTURE: CPT

## 2017-09-12 PROCEDURE — 85025 COMPLETE CBC W/AUTO DIFF WBC: CPT

## 2017-09-12 PROCEDURE — 82150 ASSAY OF AMYLASE: CPT

## 2017-09-12 PROCEDURE — 82550 ASSAY OF CK (CPK): CPT

## 2017-09-12 PROCEDURE — 74000: CPT

## 2017-09-12 PROCEDURE — 96361 HYDRATE IV INFUSION ADD-ON: CPT

## 2017-09-12 PROCEDURE — 96360 HYDRATION IV INFUSION INIT: CPT

## 2017-09-12 PROCEDURE — 99284 EMERGENCY DEPT VISIT MOD MDM: CPT

## 2017-09-12 PROCEDURE — 81003 URINALYSIS AUTO W/O SCOPE: CPT

## 2017-09-12 PROCEDURE — 83690 ASSAY OF LIPASE: CPT

## 2017-09-12 PROCEDURE — 84484 ASSAY OF TROPONIN QUANT: CPT

## 2017-09-12 PROCEDURE — 82553 CREATINE MB FRACTION: CPT

## 2017-09-12 PROCEDURE — 71020: CPT

## 2017-09-12 PROCEDURE — 93005 ELECTROCARDIOGRAM TRACING: CPT

## 2017-09-12 NOTE — XR
EXAM:

  XR Abdomen, 1 View

 

CLINICAL HISTORY:

  Reason: abdominal pain

 

TECHNIQUE:

  Frontal upright views of the abdomen/pelvis.

 

COMPARISON:

  No relevant prior studies available.

 

FINDINGS:

  Intraperitoneal space:  No free air is seen.

  Gastrointestinal tract:  Air is seen mixing with moderate amount of 

colonic stool throughout.  No grossly dilated small bowel loops nor 

gastric distention.

  Organs:  Previous aortobiiliac stent graft placement.  Multiple small 

bilateral pelvic round calcifications are indeterminate, statistically 

phleboliths.

  Bones/joints:  Dextroscoliosis centered at the L2 level.  Multilevel 

degenerative changes.

 

IMPRESSION:     

1.  No definite evidence of intestinal obstruction, nor perforation.

2.  Moderate amount of colonic stool is noted.

 

Comment: The findings could be correlated and followed clinically to 

guide further imaging follow-up as clinically indicated.

## 2017-09-12 NOTE — XR
EXAM:

  XR Chest, 2 Views

 

CLINICAL HISTORY:

  Reason: abdominal pain and distention.  History of atrial fibrillation, 

CAD, pacemaker and hypertension.

 

TECHNIQUE:

  Frontal and lateral views of the chest.

 

COMPARISON:

  3/19/17 two-view chest.

 

FINDINGS:

  Lungs:  The lungs are stable without new focal infiltrate.

  Pleural space:  No pleural effusion or pneumothorax.

  Heart:  Stable cardiomegaly.

  Mediastinum: Stable including mild aortic ectasia.

  Bones/joints: Stable including degenerative changes and thoracolumbar 

dextroscoliosis.

  Tubes, lines and devices:  Interval placement of left-sided 2-lead 

pacemaker.

  Upper abdomen:  Mild elevation of the left diaphragm, beneath which 

there is again gastric fundus and splenic flexure, unchanged.

 

IMPRESSION:     

1.  Interval placement of pacemaker.

2.  Otherwise, no new acute intrathoracic abnormality is seen.

## 2017-09-12 NOTE — ED
Abdominal Pain HPI





- General


Chief Complaint: Abdominal Pain


Stated Complaint: Indigestion/Gas


Time Seen by Provider: 09/12/17 21:00


Source: patient, family, RN notes reviewed


Mode of arrival: ambulatory


Limitations: no limitations





- History of Present Illness


Initial Comments: 





This is a 87-year-old male was brought in by family because of 4 days of 

feeling like he swallowed gas.  He's had abdominal distention some discomfort 

some radiation up into his chest he's had no relief with antiacids except for a 

small amount relief today finally after several days.  He denies any overt 

chest pain no fevers chills nausea vomiting sweats or other symptoms.  No 

diarrhea he is passing gas from below.  No dysuria hematuria.


MD Complaint: abdominal pain





- Related Data


 Home Medications











 Medication  Instructions  Recorded  Confirmed


 


Hydrochlorothiazide [Hydrodiuril] 25 mg PO DAILY 11/02/15 09/12/17


 


Warfarin [Coumadin] 5 mg PO HS 11/02/15 09/12/17


 


Aspirin EC [Ecotrin Low Dose] 81 mg PO HS 03/19/17 09/12/17


 


Atorvastatin [Lipitor] 20 mg PO HS 03/19/17 09/12/17


 


Lisinopril [Zestril] 20 mg PO DAILY 03/19/17 09/12/17


 


Memantine HCl/Donepezil HCl 1 cap PO DAILY 09/12/17 09/12/17





[Namzaric 28 mg-10 mg Capsule]   


 


Metoprolol Succinate [Toprol XL] 25 mg PO DAILY 09/12/17 09/12/17


 


Propafenone [Rythmol] 225 mg PO TID 09/12/17 09/12/17


 


Zolpidem Tartrate [Ambien] 5 mg PO HS 09/12/17 09/12/17











 Allergies











Allergy/AdvReac Type Severity Reaction Status Date / Time


 


Sulfa (Sulfonamide Allergy  Unknown Verified 09/12/17 20:43





Antibiotics)     














Review of Systems


ROS Statement: 


Those systems with pertinent positive or pertinent negative responses have been 

documented in the HPI.





ROS Other: All systems not noted in ROS Statement are negative.





Past Medical History


Past Medical History: Atrial Fibrillation, Coronary Artery Disease (CAD), 

Hypertension


Additional Past Medical History / Comment(s): AAA, deaf in left ear, dementia


History of Any Multi-Drug Resistant Organisms: None Reported


Past Surgical History: Heart Catheterization With Stent, Hernia Repair, 

Orthopedic Surgery


Additional Past Surgical History / Comment(s): bilateral knee replacements


Additional Past Anesthesia/Blood Transfusion Reaction / Comment(s): Slow to 

wake up after anesthesia


Date of Last Stent Placement:: Unsure, >5 years


Past Psychological History: Anxiety


Smoking Status: Former smoker


Past Alcohol Use History: None Reported


Past Drug Use History: None Reported





- Past Family History


  ** Father


History Unknown: Yes


Family Medical History: Myocardial Infarction (MI)





  ** Mother


History Unknown: Yes


Family Medical History: Cancer





General Exam





- General Exam Comments


Initial Comments: 





This is a well-developed well-nourished awake alert oriented 3 male


Limitations: no limitations


General appearance: alert, in no apparent distress


Head exam: Present: atraumatic, normocephalic, normal inspection


Eye exam: Present: normal appearance, PERRL, EOMI.  Absent: scleral icterus, 

conjunctival injection, periorbital swelling


ENT exam: Present: normal exam, mucous membranes moist


Neck exam: Present: normal inspection.  Absent: tenderness, meningismus, 

lymphadenopathy


Respiratory exam: Present: normal lung sounds bilaterally.  Absent: respiratory 

distress, wheezes, rales, rhonchi, stridor


Cardiovascular Exam: Present: regular rate, normal rhythm, normal heart sounds.

  Absent: systolic murmur, diastolic murmur, rubs, gallop, clicks


GI/Abdominal exam: Present: soft, distended, normal bowel sounds.  Absent: 

tenderness, guarding, rebound, rigid, bruit, pulsatile mass, hernia


Extremities exam: Present: normal inspection, full ROM, normal capillary 

refill.  Absent: tenderness, pedal edema, joint swelling, calf tenderness


Back exam: Present: normal inspection


Neurological exam: Present: alert, oriented X3, CN II-XII intact


Psychiatric exam: Present: normal affect, normal mood


Skin exam: Present: warm, dry, intact, normal color.  Absent: rash





Course


 Vital Signs











  09/12/17 09/12/17 09/12/17





  20:30 20:49 22:47


 


Temperature 97.6 F  


 


Pulse Rate 63 56 L 88


 


Respiratory 16 18 18





Rate   


 


Blood Pressure 174/81 133/59 137/74


 


O2 Sat by Pulse 98 96 88 L





Oximetry   














Medical Decision Making





- Medical Decision Making





I did discuss findings with the patient family patient will be discharged is 

keep his follow-up with Dr. Henry tomorrow.  I did recommend increasing his 

oral fluids.  X-ray show some increased stool but otherwise no acute findings





- Lab Data


Result diagrams: 


 09/12/17 21:43





 09/12/17 21:43


 Lab Results











  09/12/17 09/12/17 09/12/17 Range/Units





  21:43 21:43 21:43 


 


WBC    7.0  (3.8-10.6)  k/uL


 


RBC    3.84 L  (4.30-5.90)  m/uL


 


Hgb    12.9 L  (13.0-17.5)  gm/dL


 


Hct    38.0 L  (39.0-53.0)  %


 


MCV    98.8  (80.0-100.0)  fL


 


MCH    33.6  (25.0-35.0)  pg


 


MCHC    34.0  (31.0-37.0)  g/dL


 


RDW    13.5  (11.5-15.5)  %


 


Plt Count    229  (150-450)  k/uL


 


Neutrophils % (Manual)    65  %


 


Lymphocytes % (Manual)    15  %


 


Monocytes % (Manual)    15  %


 


Eosinophils % (Manual)    5  %


 


Neutrophils # (Manual)    4.55  (1.3-7.7)  k/uL


 


Lymphocytes # (Manual)    1.05  (1.0-4.8)  k/uL


 


Monocytes # (Manual)    1.05 H  (0-1.0)  k/uL


 


Eosinophils # (Manual)    0.35  (0-0.7)  k/uL


 


Nucleated RBCs    0  (0-0)  /100 WBC


 


Polychromasia    Present  


 


Sodium  139    (137-145)  mmol/L


 


Potassium  3.8    (3.5-5.1)  mmol/L


 


Chloride  103    ()  mmol/L


 


Carbon Dioxide  28    (22-30)  mmol/L


 


Anion Gap  8    mmol/L


 


BUN  17    (9-20)  mg/dL


 


Creatinine  1.09    (0.66-1.25)  mg/dL


 


Est GFR (MDRD) Af Amer  >60    (>60 ml/min/1.73 sqM)  


 


Est GFR (MDRD) Non-Af  >60    (>60 ml/min/1.73 sqM)  


 


Glucose  83    (74-99)  mg/dL


 


Calcium  9.1    (8.4-10.2)  mg/dL


 


Total Bilirubin  0.6    (0.2-1.3)  mg/dL


 


AST  21    (17-59)  U/L


 


ALT  31    (21-72)  U/L


 


Alkaline Phosphatase  97    ()  U/L


 


Total Creatine Kinase   67   ()  U/L


 


CK-MB (CK-2)   1.1   (0.0-2.4)  ng/mL


 


CK-MB (CK-2) Rel Index   1.6   


 


Troponin I   <0.012   (0.000-0.034)  ng/mL


 


Total Protein  6.6    (6.3-8.2)  g/dL


 


Albumin  3.8    (3.5-5.0)  g/dL


 


Amylase  63    ()  U/L


 


Lipase  124    ()  U/L


 


Urine Color     


 


Urine Appearance     (Clear)  


 


Urine pH     (5.0-8.0)  


 


Ur Specific Gravity     (1.001-1.035)  


 


Urine Protein     (Negative)  


 


Urine Glucose (UA)     (Negative)  


 


Urine Ketones     (Negative)  


 


Urine Blood     (Negative)  


 


Urine Nitrite     (Negative)  


 


Urine Bilirubin     (Negative)  


 


Urine Urobilinogen     (<2.0)  mg/dL


 


Ur Leukocyte Esterase     (Negative)  














  09/12/17 Range/Units





  22:12 


 


WBC   (3.8-10.6)  k/uL


 


RBC   (4.30-5.90)  m/uL


 


Hgb   (13.0-17.5)  gm/dL


 


Hct   (39.0-53.0)  %


 


MCV   (80.0-100.0)  fL


 


MCH   (25.0-35.0)  pg


 


MCHC   (31.0-37.0)  g/dL


 


RDW   (11.5-15.5)  %


 


Plt Count   (150-450)  k/uL


 


Neutrophils % (Manual)   %


 


Lymphocytes % (Manual)   %


 


Monocytes % (Manual)   %


 


Eosinophils % (Manual)   %


 


Neutrophils # (Manual)   (1.3-7.7)  k/uL


 


Lymphocytes # (Manual)   (1.0-4.8)  k/uL


 


Monocytes # (Manual)   (0-1.0)  k/uL


 


Eosinophils # (Manual)   (0-0.7)  k/uL


 


Nucleated RBCs   (0-0)  /100 WBC


 


Polychromasia   


 


Sodium   (137-145)  mmol/L


 


Potassium   (3.5-5.1)  mmol/L


 


Chloride   ()  mmol/L


 


Carbon Dioxide   (22-30)  mmol/L


 


Anion Gap   mmol/L


 


BUN   (9-20)  mg/dL


 


Creatinine   (0.66-1.25)  mg/dL


 


Est GFR (MDRD) Af Amer   (>60 ml/min/1.73 sqM)  


 


Est GFR (MDRD) Non-Af   (>60 ml/min/1.73 sqM)  


 


Glucose   (74-99)  mg/dL


 


Calcium   (8.4-10.2)  mg/dL


 


Total Bilirubin   (0.2-1.3)  mg/dL


 


AST   (17-59)  U/L


 


ALT   (21-72)  U/L


 


Alkaline Phosphatase   ()  U/L


 


Total Creatine Kinase   ()  U/L


 


CK-MB (CK-2)   (0.0-2.4)  ng/mL


 


CK-MB (CK-2) Rel Index   


 


Troponin I   (0.000-0.034)  ng/mL


 


Total Protein   (6.3-8.2)  g/dL


 


Albumin   (3.5-5.0)  g/dL


 


Amylase   ()  U/L


 


Lipase   ()  U/L


 


Urine Color  Light Yellow  


 


Urine Appearance  Clear  (Clear)  


 


Urine pH  6.5  (5.0-8.0)  


 


Ur Specific Gravity  1.003  (1.001-1.035)  


 


Urine Protein  Negative  (Negative)  


 


Urine Glucose (UA)  Negative  (Negative)  


 


Urine Ketones  Negative  (Negative)  


 


Urine Blood  Negative  (Negative)  


 


Urine Nitrite  Negative  (Negative)  


 


Urine Bilirubin  Negative  (Negative)  


 


Urine Urobilinogen  <2.0  (<2.0)  mg/dL


 


Ur Leukocyte Esterase  Negative  (Negative)  














- EKG Data


-: EKG Interpreted by Me (Pacemaker rhythm of 55  QT cyst QTc of 456/436 

left exodeviation LVH)





- Radiology Data


Radiology results: report reviewed, image reviewed





Disposition


Clinical Impression: 


 Obstipation, Dehydration





Disposition: HOME SELF-CARE


Condition: Good


Instructions:  Obstipation (ED), Dehydration (ED)


Referrals: 


Chandu Wong MD [Primary Care Provider] - 1-2 days

## 2017-12-13 ENCOUNTER — HOSPITAL ENCOUNTER (OUTPATIENT)
Dept: HOSPITAL 47 - EC | Age: 82
Setting detail: OBSERVATION
LOS: 1 days | Discharge: HOME | End: 2017-12-14
Attending: HOSPITALIST | Admitting: HOSPITALIST
Payer: MEDICARE

## 2017-12-13 VITALS — RESPIRATION RATE: 18 BRPM

## 2017-12-13 VITALS — BODY MASS INDEX: 30.8 KG/M2

## 2017-12-13 DIAGNOSIS — I25.10: ICD-10-CM

## 2017-12-13 DIAGNOSIS — F41.9: ICD-10-CM

## 2017-12-13 DIAGNOSIS — Z87.891: ICD-10-CM

## 2017-12-13 DIAGNOSIS — Z79.899: ICD-10-CM

## 2017-12-13 DIAGNOSIS — G93.41: ICD-10-CM

## 2017-12-13 DIAGNOSIS — I71.4: ICD-10-CM
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
## 2017-12-13 DIAGNOSIS — R41.82: Primary | ICD-10-CM

## 2017-12-13 DIAGNOSIS — F03.90: ICD-10-CM

## 2017-12-13 DIAGNOSIS — Z79.01: ICD-10-CM

## 2017-12-13 DIAGNOSIS — Z79.82: ICD-10-CM

## 2017-12-13 DIAGNOSIS — I10: ICD-10-CM

## 2017-12-13 DIAGNOSIS — T18.128A: ICD-10-CM

## 2017-12-13 DIAGNOSIS — Z88.2: ICD-10-CM

## 2017-12-13 DIAGNOSIS — H91.92: ICD-10-CM

## 2017-12-13 DIAGNOSIS — Z95.5: ICD-10-CM

## 2017-12-13 DIAGNOSIS — I48.91: ICD-10-CM

## 2017-12-13 LAB
ALP SERPL-CCNC: 96 U/L (ref 38–126)
ALT SERPL-CCNC: 31 U/L (ref 21–72)
ANION GAP SERPL CALC-SCNC: 11 MMOL/L
APTT BLD: 25.3 SEC (ref 22–30)
AST SERPL-CCNC: 20 U/L (ref 17–59)
BUN SERPL-SCNC: 23 MG/DL (ref 9–20)
CALCIUM SPEC-MCNC: 9.4 MG/DL (ref 8.4–10.2)
CELLS COUNTED: 100
CH: 33
CHCM: 33.1
CHLORIDE SERPL-SCNC: 99 MMOL/L (ref 98–107)
CO2 SERPL-SCNC: 30 MMOL/L (ref 22–30)
ERYTHROCYTE [DISTWIDTH] IN BLOOD BY AUTOMATED COUNT: 4.3 M/UL (ref 4.3–5.9)
ERYTHROCYTE [DISTWIDTH] IN BLOOD: 13.8 % (ref 11.5–15.5)
GLUCOSE SERPL-MCNC: 138 MG/DL (ref 74–99)
HCT VFR BLD AUTO: 43.1 % (ref 39–53)
HDW: 2.13
HGB BLD-MCNC: 13.7 GM/DL (ref 13–17.5)
INR PPP: 1.2 (ref ?–1.2)
MCH RBC QN AUTO: 31.9 PG (ref 25–35)
MCHC RBC AUTO-ENTMCNC: 31.9 G/DL (ref 31–37)
MCV RBC AUTO: 100.2 FL (ref 80–100)
NON-AFRICAN AMERICAN GFR(MDRD): >60
POTASSIUM SERPL-SCNC: 3.7 MMOL/L (ref 3.5–5.1)
PROT SERPL-MCNC: 6.9 G/DL (ref 6.3–8.2)
PT BLD: 11.3 SEC (ref 9–12)
SODIUM SERPL-SCNC: 140 MMOL/L (ref 137–145)
WBC # BLD AUTO: 7.2 K/UL (ref 3.8–10.6)
WBC (PEROX): 7.13

## 2017-12-13 PROCEDURE — 96374 THER/PROPH/DIAG INJ IV PUSH: CPT

## 2017-12-13 PROCEDURE — 80053 COMPREHEN METABOLIC PANEL: CPT

## 2017-12-13 PROCEDURE — 96375 TX/PRO/DX INJ NEW DRUG ADDON: CPT

## 2017-12-13 PROCEDURE — 71020: CPT

## 2017-12-13 PROCEDURE — 70360 X-RAY EXAM OF NECK: CPT

## 2017-12-13 PROCEDURE — 36415 COLL VENOUS BLD VENIPUNCTURE: CPT

## 2017-12-13 PROCEDURE — 85025 COMPLETE CBC W/AUTO DIFF WBC: CPT

## 2017-12-13 PROCEDURE — 99285 EMERGENCY DEPT VISIT HI MDM: CPT

## 2017-12-13 PROCEDURE — 85730 THROMBOPLASTIN TIME PARTIAL: CPT

## 2017-12-13 PROCEDURE — 85610 PROTHROMBIN TIME: CPT

## 2017-12-13 RX ADMIN — PROPAFENONE HYDROCHLORIDE SCH: 225 TABLET, FILM COATED ORAL at 22:40

## 2017-12-13 NOTE — ED
General Adult HPI





- General


Chief complaint: ENT


Stated complaint: FB IN THROAT


Time Seen by Provider: 12/13/17 17:24


Source: patient, family, RN notes reviewed, old records reviewed


Mode of arrival: ambulatory


Limitations: altered mental status





- History of Present Illness


Initial comments: 





87-year-old male presents with concern for esophageal foreign body.  Patient 

was eating pork past with 10 minutes prior to arrival.  He had sensation all 

lodged foreign body.  He has been spitting since the incident.  Patient did 

state he had some chest pain with the episode.  This currently resolved.  

Patient is pain-free.  Patient has remote history of esophageal foreign body 

requiring endoscopy.





- Related Data


 Home Medications











 Medication  Instructions  Recorded  Confirmed


 


Hydrochlorothiazide [Hydrodiuril] 25 mg PO DAILY 11/02/15 09/12/17


 


Warfarin [Coumadin] 5 mg PO HS 11/02/15 09/12/17


 


Aspirin EC [Ecotrin Low Dose] 81 mg PO HS 03/19/17 09/12/17


 


Atorvastatin [Lipitor] 20 mg PO HS 03/19/17 09/12/17


 


Lisinopril [Zestril] 20 mg PO DAILY 03/19/17 09/12/17


 


Memantine HCl/Donepezil HCl 1 cap PO DAILY 09/12/17 09/12/17





[Namzaric 28 mg-10 mg Capsule]   


 


Metoprolol Succinate [Toprol XL] 25 mg PO DAILY 09/12/17 09/12/17


 


Propafenone [Rythmol] 225 mg PO TID 09/12/17 09/12/17


 


Zolpidem Tartrate [Ambien] 5 mg PO HS 09/12/17 09/12/17











 Allergies











Allergy/AdvReac Type Severity Reaction Status Date / Time


 


Sulfa (Sulfonamide Allergy  Unknown Verified 12/13/17 17:31





Antibiotics)     














Review of Systems


ROS Statement: 


Those systems with pertinent positive or pertinent negative responses have been 

documented in the HPI.





ROS Other: All systems not noted in ROS Statement are negative.





Past Medical History


Past Medical History: Atrial Fibrillation, Coronary Artery Disease (CAD), 

Hypertension


Additional Past Medical History / Comment(s): AAA, deaf in left ear, dementia


History of Any Multi-Drug Resistant Organisms: None Reported


Past Surgical History: Heart Catheterization With Stent, Hernia Repair, 

Orthopedic Surgery


Additional Past Surgical History / Comment(s): bilateral knee replacements


Additional Past Anesthesia/Blood Transfusion Reaction / Comment(s): Slow to 

wake up after anesthesia


Date of Last Stent Placement:: Unsure, >5 years


Past Psychological History: Anxiety


Smoking Status: Former smoker


Past Alcohol Use History: None Reported


Past Drug Use History: None Reported





- Past Family History


  ** Father


History Unknown: Yes


Family Medical History: Myocardial Infarction (MI)





  ** Mother


History Unknown: Yes


Family Medical History: Cancer





General Exam


Limitations: altered mental status


General appearance: alert, in no apparent distress


Head exam: Present: atraumatic, normocephalic


Eye exam: Present: normal appearance, PERRL


ENT exam: Present: normal exam, normal oropharynx


Neck exam: Present: normal inspection.  Absent: tenderness, meningismus


Respiratory exam: Present: normal lung sounds bilaterally.  Absent: respiratory 

distress, stridor


Cardiovascular Exam: Present: regular rate, normal rhythm


GI/Abdominal exam: Present: soft.  Absent: distended, tenderness


Extremities exam: Present: normal inspection


Neurological exam: Present: alert.  Absent: motor sensory deficit


Psychiatric exam: Present: normal affect, normal mood


Skin exam: Present: warm, dry, intact.  Absent: cyanosis, diaphoretic





Course


 Vital Signs











  12/13/17 12/13/17 12/13/17





  17:28 17:34 17:50


 


Temperature 98.7 F  


 


Pulse Rate 73 71 69


 


Respiratory 18 18 18





Rate   


 


Blood Pressure 185/88 178/88 108/58


 


O2 Sat by Pulse 98 96 95





Oximetry   














- Reevaluation(s)


Reevaluation #1: 





12/13/17 18:25


Patient is given Ativan, glucagon, and nitroglycerin, esophageal foreign body 

resolved.  Patient is able to tolerate liquids without difficulty.





Medical Decision Making





- Medical Decision Making





87-year-old male presenting with concern for esophageal foreign body after 

eating pork.  She'll evaluation, patient is unable to handle his secretions, 

unable to drink.  No stridor or respiratory distress.  He is given glucagon, 

Ativan, nitroglycerin.  This does resolve his symptoms.  He is able to tolerate 

liquids without difficulty.








Laboratory studies reveal normal white blood cell count, stable hemoglobin, 

tried normal limits.  X-ray of the chest and soft tissue neck is negative for 

foreign body or acute intrathoracic process.








Patient will follow-up with gastroenterology.





Diagnosis: Esophageal foreign body, resolved





- Lab Data


Result diagrams: 


 12/13/17 17:36





 12/13/17 17:36


 Lab Results











  12/13/17 12/13/17 12/13/17 Range/Units





  17:36 17:36 17:36 


 


WBC  7.2    (3.8-10.6)  k/uL


 


RBC  4.30    (4.30-5.90)  m/uL


 


Hgb  13.7    (13.0-17.5)  gm/dL


 


Hct  43.1    (39.0-53.0)  %


 


MCV  100.2 H    (80.0-100.0)  fL


 


MCH  31.9    (25.0-35.0)  pg


 


MCHC  31.9    (31.0-37.0)  g/dL


 


RDW  13.8    (11.5-15.5)  %


 


Plt Count  252    (150-450)  k/uL


 


Neutrophils % (Manual)  59    %


 


Band Neutrophils %  4    %


 


Lymphocytes % (Manual)  33    %


 


Monocytes % (Manual)  3    %


 


Eosinophils % (Manual)  1    %


 


Neutrophils # (Manual)  4.50    (1.3-7.7)  k/uL


 


Lymphocytes # (Manual)  2.38    (1.0-4.8)  k/uL


 


Monocytes # (Manual)  0.22    (0-1.0)  k/uL


 


Eosinophils # (Manual)  0.07    (0-0.7)  k/uL


 


Nucleated RBCs  0    (0-0)  /100 WBC


 


Manual Slide Review  Performed    


 


Poikilocytosis (manual  Present    


 


PT    11.3  (9.0-12.0)  sec


 


INR    1.2 H  (<1.2)  


 


APTT    25.3  (22.0-30.0)  sec


 


Sodium   140   (137-145)  mmol/L


 


Potassium   3.7   (3.5-5.1)  mmol/L


 


Chloride   99   ()  mmol/L


 


Carbon Dioxide   30   (22-30)  mmol/L


 


Anion Gap   11   mmol/L


 


BUN   23 H   (9-20)  mg/dL


 


Creatinine   1.10   (0.66-1.25)  mg/dL


 


Est GFR (MDRD) Af Amer   >60   (>60 ml/min/1.73 sqM)  


 


Est GFR (MDRD) Non-Af   >60   (>60 ml/min/1.73 sqM)  


 


Glucose   138 H   (74-99)  mg/dL


 


Calcium   9.4   (8.4-10.2)  mg/dL


 


Total Bilirubin   0.5   (0.2-1.3)  mg/dL


 


AST   20   (17-59)  U/L


 


ALT   31   (21-72)  U/L


 


Alkaline Phosphatase   96   ()  U/L


 


Total Protein   6.9   (6.3-8.2)  g/dL


 


Albumin   4.0   (3.5-5.0)  g/dL














Disposition


Clinical Impression: 


 Esophageal foreign body





Disposition: HOME SELF-CARE


Condition: Good


Instructions:  Esophageal Foreign Body (ED)


Referrals: 


Chandu Wong MD [Primary Care Provider] - 1-2 days


Rickey Brown MD [STAFF PHYSICIAN] - 1-2 days


Time of Disposition: 18:29

## 2017-12-13 NOTE — XR
EXAMINATION TYPE: XR soft tissue neck

 

DATE OF EXAM: 12/13/2017

 

COMPARISON: Prior cervical spine x-ray May 18, 2014

 

HISTORY: Foreign body inhalation

 

TECHNIQUE: 2 views of soft tissue neck are acquired.

 

FINDINGS: No suspicious prevertebral soft tissue swelling is seen on lateral view. Epiglottis and laura
lecula are within normal limits on lateral view. No suspicious narrowing of subglottic airway is seen
 on frontal view. No suspicious radiodense foreign body is identified. There is moderate spurring and
 disc space narrowing C4-C5 through C6-C7 levels redemonstrated.

 

IMPRESSION: No suspicious radiodense foreign body identified.

## 2017-12-13 NOTE — XR
EXAMINATION TYPE: XR chest 2V

 

DATE OF EXAM: 12/13/2017

 

COMPARISON: Chest x-ray September 12, 2017

 

HISTORY: Foreign body in throat per patient.

 

TECHNIQUE:  Frontal and lateral views of the chest are obtained.

 

FINDINGS: There is poor inspiration on current study with by basilar opacity favoring atelectasis. Th
ere is redemonstration of cardiomegaly with dual lead pacemaker as well as atherosclerotic and ectati
c aorta causing tracheal deviation. There is redemonstration elevated left hemidiaphragm. Osseous str
uctures are demineralized.

 

IMPRESSION:  Cardiomegaly redemonstrated. Poor inspiration is noted with new bibasilar atelectasis.

## 2017-12-14 VITALS — HEART RATE: 71 BPM | SYSTOLIC BLOOD PRESSURE: 150 MMHG | TEMPERATURE: 97.6 F | DIASTOLIC BLOOD PRESSURE: 79 MMHG

## 2017-12-14 RX ADMIN — PROPAFENONE HYDROCHLORIDE SCH MG: 225 TABLET, FILM COATED ORAL at 08:25

## 2017-12-15 NOTE — HP
HISTORY AND PHYSICAL



This is a combined HISTORY AND PHYSICAL and DISCHARGE SUMMARY.



CHIEF COMPLAINT:

Change in mental status and dysphagia.



HISTORY OF PRESENT ILLNESS:

This 87-year-old gentleman with a past medical history of multiple medical 
problems

including dementia, history of atrial fibrillation, CAD, hypertension, 
abdominal aortic

aneurysm, CAD stent being followed by Dr. Chandu Wong in the outpatient setting

apparently had choked on a piece of meat, pork, which happened a few months 
ago.  The

patient came to [facility] and  significant relief, but subsequently 
the

patient also given Ativan. Patient had some change in mental status. The 
patient is

confused.  The patient admitted for further evaluation and treatment. There is 
no

history of any fever or rigors.  No history of headache, loss of consciousness 
or

seizures.



PAST MEDICAL HISTORY:

Atrial fibrillation, CAD, hypertension, abdominal aortic aneurysm, history of 
CAD,

stent.



MEDICATIONS:

Home medications are:

1. Ambien 5 mg q.h.s.

2. Coumadin 5 mg Monday, Tuesday, Wednesday, Thursday, Friday, Saturday.

3. Namenda 1 capsule p.o. daily.

4. Lipitor 20 mg q.h.s.

5. Ecotrin 81 mg q.h.s.

6. Warfarin sodium 7.5 mg on Sunday.

7. Rythmol 225 mg p.o. t.i.d.

8. Toprol XL 25 mg p.o. daily.

9. Zestril 20 mg p.o. daily.

10.HydroDIURIL 25 mg p.o. daily.



ALLERGIES:

Allergies to SULFA.



FAMILY HISTORY:

History of myocardial infarction in the family.



SOCIAL HISTORY:

Previous history of smoking. No history of alcohol intake.



REVIEW OF SYSTEMS:

ENT: Diminished hearing and diminished vision.

CARDIOVASCULAR SYSTEM: No angina, palpitations.

RESPIRATORY SYSTEM:  No cough, hemoptysis.

GI: No nausea, otherwise, as mentioned earlier.

: No dysuria.

NERVOUS SYSTEM:  As mentioned earlier.

ALLERGY/IMMUNOLOGY:  As mentioned earlier.

MUSCULOSKELETAL: As mentioned earlier.

HEMATOLOGY/ONCOLOGY:  Negative.

ENDOCRINE:  No history of diabetes or hypothyroidism.

CONSTITUTIONAL:  As mentioned earlier.

DERMATOLOGY:  Negative.

RHEUMATOLOGY:  Negative.

PSYCHIATRY:  As mentioned earlier.



PHYSICAL EXAMINATION:

Patient is alert, oriented x3. Pulse is 71, blood pressure 150/79, respiration 
18, temp

97.6, pulse ox 97% on room air.

HEENT: Conjunctivae normal.

NECK:  No jugular venous distention.

CARDIOVASCULAR: S1 and S2 muffled.

RESPIRATORY:  Breath sounds diminished at the base. A few scattered rhonchi and

crackles.

ABDOMEN:  Soft, nontender.  No mass palpable.

LEGS: No edema, no swelling.

NERVOUS SYSTEM: Higher functions as mentioned earlier.  Moves all 4limbs.  No 
focal

motor or sensory deficits.

LYMPHATICS:  No lymphadenopathy of the neck, axillae or groin.

SKIN:  No ulcer, rash or bleeding.



LAB:

.2 otherwise, INR 1.2 and glucose 138.



ASSESSMENT:

1. Change in mental status, metabolic encephalopathy, possibly secondary to

    medications.

2. Dysphagia secondary to foreign body.

3. Atrial fibrillation.

4. Coronary artery disease.

5. Abdominal aortic aneurysm.

6. History of dementia.

7. History of coronary artery disease, stent.

8. History of hernia repair.



RECOMMENDATIONS AND DISCUSSION:

In this 87-year-old gentleman who presented with multiple complex medical issues
, we

will continue current medications. Dysphagia has resolved. Confusion could be 
most

likely due to administration of Ativan.  Otherwise we would recommend to 
continue the

current medications. The patient is currently back to his baseline.  The 
patient will

be discharged with further plans to follow up with Dr. Chandu Wong in the 
outpatient

setting.





MMJORGE LUISL / BILLN: [id number] / Job#: [id number]

MARA

## 2018-01-05 ENCOUNTER — HOSPITAL ENCOUNTER (EMERGENCY)
Dept: HOSPITAL 47 - EC | Age: 83
Discharge: ANOTHER HOSPITAL | End: 2018-01-05
Payer: MEDICARE

## 2018-01-05 VITALS — DIASTOLIC BLOOD PRESSURE: 74 MMHG | HEART RATE: 81 BPM | SYSTOLIC BLOOD PRESSURE: 164 MMHG

## 2018-01-05 VITALS — TEMPERATURE: 97.9 F | RESPIRATION RATE: 18 BRPM

## 2018-01-05 DIAGNOSIS — Z96.653: ICD-10-CM

## 2018-01-05 DIAGNOSIS — Z95.5: ICD-10-CM
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
## 2018-01-05 DIAGNOSIS — Z79.82: ICD-10-CM

## 2018-01-05 DIAGNOSIS — Z88.2: ICD-10-CM

## 2018-01-05 DIAGNOSIS — I10: ICD-10-CM

## 2018-01-05 DIAGNOSIS — Z79.899: ICD-10-CM

## 2018-01-05 DIAGNOSIS — S06.5X0A: Primary | ICD-10-CM

## 2018-01-05 DIAGNOSIS — I25.10: ICD-10-CM

## 2018-01-05 DIAGNOSIS — Z79.01: ICD-10-CM

## 2018-01-05 DIAGNOSIS — I48.91: ICD-10-CM

## 2018-01-05 DIAGNOSIS — Z87.891: ICD-10-CM

## 2018-01-05 DIAGNOSIS — R40.2412: ICD-10-CM

## 2018-01-05 LAB
ALBUMIN SERPL-MCNC: 3.8 G/DL (ref 3.5–5)
ALP SERPL-CCNC: 87 U/L (ref 38–126)
ALT SERPL-CCNC: 27 U/L (ref 21–72)
ANION GAP SERPL CALC-SCNC: 11 MMOL/L
APTT BLD: 32 SEC (ref 22–30)
AST SERPL-CCNC: 21 U/L (ref 17–59)
BUN SERPL-SCNC: 17 MG/DL (ref 9–20)
CALCIUM SPEC-MCNC: 9.4 MG/DL (ref 8.4–10.2)
CELLS COUNTED: 100
CHLORIDE SERPL-SCNC: 97 MMOL/L (ref 98–107)
CK SERPL-CCNC: 108 U/L (ref 55–170)
CO2 SERPL-SCNC: 28 MMOL/L (ref 22–30)
EOSINOPHIL # BLD MANUAL: 0.42 K/UL (ref 0–0.7)
ERYTHROCYTE [DISTWIDTH] IN BLOOD BY AUTOMATED COUNT: 4.14 M/UL (ref 4.3–5.9)
ERYTHROCYTE [DISTWIDTH] IN BLOOD: 13.6 % (ref 11.5–15.5)
GLUCOSE SERPL-MCNC: 104 MG/DL (ref 74–99)
HCT VFR BLD AUTO: 41.1 % (ref 39–53)
HGB BLD-MCNC: 13.4 GM/DL (ref 13–17.5)
INR PPP: 2.3 (ref ?–1.2)
LYMPHOCYTES # BLD MANUAL: 1.43 K/UL (ref 1–4.8)
MAGNESIUM SPEC-SCNC: 1.9 MG/DL (ref 1.6–2.3)
MCH RBC QN AUTO: 32.3 PG (ref 25–35)
MCHC RBC AUTO-ENTMCNC: 32.5 G/DL (ref 31–37)
MCV RBC AUTO: 99.3 FL (ref 80–100)
MONOCYTES # BLD MANUAL: 0.42 K/UL (ref 0–1)
NEUTROPHILS NFR BLD MANUAL: 73 %
NEUTS SEG # BLD MANUAL: 6.13 K/UL (ref 1.3–7.7)
PH UR: 6.5 [PH] (ref 5–8)
PHOSPHATE SERPL-MCNC: 2.6 MG/DL (ref 2.5–4.5)
PLATELET # BLD AUTO: 260 K/UL (ref 150–450)
POTASSIUM SERPL-SCNC: 3.9 MMOL/L (ref 3.5–5.1)
PROT SERPL-MCNC: 6.8 G/DL (ref 6.3–8.2)
PT BLD: 20.6 SEC (ref 9–12)
RBC UR QL: 2 /HPF (ref 0–5)
SODIUM SERPL-SCNC: 136 MMOL/L (ref 137–145)
SP GR UR: 1 (ref 1–1.03)
TROPONIN I SERPL-MCNC: 0.01 NG/ML (ref 0–0.03)
UROBILINOGEN UR QL STRIP: <2 MG/DL (ref ?–2)
WBC # BLD AUTO: 8.4 K/UL (ref 3.8–10.6)

## 2018-01-05 PROCEDURE — 96361 HYDRATE IV INFUSION ADD-ON: CPT

## 2018-01-05 PROCEDURE — 96360 HYDRATION IV INFUSION INIT: CPT

## 2018-01-05 PROCEDURE — 72125 CT NECK SPINE W/O DYE: CPT

## 2018-01-05 PROCEDURE — 82553 CREATINE MB FRACTION: CPT

## 2018-01-05 PROCEDURE — 84100 ASSAY OF PHOSPHORUS: CPT

## 2018-01-05 PROCEDURE — 80053 COMPREHEN METABOLIC PANEL: CPT

## 2018-01-05 PROCEDURE — 73562 X-RAY EXAM OF KNEE 3: CPT

## 2018-01-05 PROCEDURE — 81001 URINALYSIS AUTO W/SCOPE: CPT

## 2018-01-05 PROCEDURE — 96368 THER/DIAG CONCURRENT INF: CPT

## 2018-01-05 PROCEDURE — 36415 COLL VENOUS BLD VENIPUNCTURE: CPT

## 2018-01-05 PROCEDURE — 71045 X-RAY EXAM CHEST 1 VIEW: CPT

## 2018-01-05 PROCEDURE — 82550 ASSAY OF CK (CPK): CPT

## 2018-01-05 PROCEDURE — 83735 ASSAY OF MAGNESIUM: CPT

## 2018-01-05 PROCEDURE — 70450 CT HEAD/BRAIN W/O DYE: CPT

## 2018-01-05 PROCEDURE — 84484 ASSAY OF TROPONIN QUANT: CPT

## 2018-01-05 PROCEDURE — 96365 THER/PROPH/DIAG IV INF INIT: CPT

## 2018-01-05 PROCEDURE — 85610 PROTHROMBIN TIME: CPT

## 2018-01-05 PROCEDURE — 85025 COMPLETE CBC W/AUTO DIFF WBC: CPT

## 2018-01-05 PROCEDURE — 85730 THROMBOPLASTIN TIME PARTIAL: CPT

## 2018-01-05 PROCEDURE — 87086 URINE CULTURE/COLONY COUNT: CPT

## 2018-01-05 PROCEDURE — 72170 X-RAY EXAM OF PELVIS: CPT

## 2018-01-05 PROCEDURE — 99285 EMERGENCY DEPT VISIT HI MDM: CPT

## 2018-01-05 PROCEDURE — 93005 ELECTROCARDIOGRAM TRACING: CPT

## 2018-01-05 NOTE — XR
EXAMINATION TYPE: XR chest 1V

 

DATE OF EXAM: 1/5/2018

 

HISTORY: Shortness of breath.

 

COMPARISON: 12/13/17

 

TECHNIQUE: Single view of the chest is submitted.

 

FINDINGS:

Demonstrated are scattered senescent parenchymal change.  

 

There is no evidence for focal infiltrate. 

 

The heart is stable.

 

Hilar and mediastinal structures are within normal limits.  

 

Degenerative changes are seen of the dorsal spine. 

 

 IMPRESSION: 

 

1.  Chronic changes without evidence for acute pulmonary disease.

## 2018-01-05 NOTE — CT
EXAMINATION TYPE: CT brain cspine wo con

 

DATE OF EXAM: 1/5/2018

 

COMPARISON: CT brain March 19, 2017.

 

HISTORY: Fall today, questioning stroke. Neuro deficits. New onset neck pain and dizziness after fall
 injury.

 

CT DLP: 2894 mGycm. Automated Exposure Control for Dose Reduction was Utilized.

 

 

TECHNIQUE: CT scan of the head and cervical spine are performed without contrast.

 

FINDINGS: There is ventricular and sulcal prominence consistent with diffuse cerebral atrophy. Is low
-attenuation in the periventricular white matter is prominent right greater than left bilateral parie
stefano-occipital regions. There is new mixed right-sided extra-axial fluid collection with hyperdense an
d hypodense component measuring up to 18 mm in thickness. 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
bdural hematoma. There is midline shift of 5 mm at level of septum pellucidum on axial image 28. Scle
ral calcification both globes is present. Visualized paranasal sinuses are clear. Nasal septum is dev
iated to left of midline similar to prior. The calvarium is intact.

 

Cervical spine is visualized in its entirety from C1 through upper thoracic levels and demonstrates s
atisfactory alignment without evidence of acute fracture or dislocation.  Prevertebral soft tissue ap
pears within normal limits.  The C1-C2 articulation is within normal limits on the coronal images.  

 

Vertebral body heights are maintained. There is moderate to advanced spurring and disc space narrowin
g with subchondral cystic change at C4-C5 through C6-C7 levels. Posterior spur disc complexes are eff
acing anterior thecal sac at C5-C6 and C6-C7 levels on sagittal and axial images. Review of axial lui
ges shows multilevel uncovertebral facet degenerative changes bilaterally contributing to bilateral n
eural foraminal narrowing most prominent bilateral C3-C4, right C4-C5, and right C5-C6 levels. There 
is partial visualization of pacemaker wires in the upper thorax. Lungs are grossly clear. Thyroid gla
nd is felt within normal limits. There is fairly moderate calcified plaque at both carotid bulbs note
d.

 

IMPRESSION:

1. There is no acute fracture or dislocation evident in the cervical spine. Multilevel degenerative c
hanges are present as detailed above.

2. There is fairly moderate to large size right-sided acute on chronic subdural hematoma. There is lo
yeison mass effect with 5 mm midline shift away noted.

 

Critical results communicated to ordering ER physician via telephone at time of dictation.

## 2018-01-05 NOTE — XR
EXAMINATION TYPE: XR knee complete bilateral

 

DATE OF EXAM: 1/5/2018

 

CLINICAL HISTORY: pain

 

TECHNIQUE:  Three views of the bilateral knees are obtained.

 

COMPARISON: None.

 

FINDINGS:  There is no acute fracture/dislocation. 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 Small 
suprapatellar joint effusion is noted. The overlying soft tissue appears unremarkable.

 

IMPRESSION:  There is no acute fracture or dislocation.ICD 10 NO FRACTURE, INITIAL EVALUATION

## 2018-01-05 NOTE — ED
General Adult HPI





- General


Chief complaint: Fall


Stated complaint: poss stroke,fall


Time Seen by Provider: 01/05/18 06:21


Source: EMS, RN notes reviewed, old records reviewed


Mode of arrival: EMS


Limitations: no limitations





- History of Present Illness


Initial comments: 





This is an 87-year-old male to the ER for evaluation falls.  Falls with altered 

mental status.  Patient has significant medical history including menstrual 

both reduced.  Patient is unknown if he hit his head, not complaining of 

headache but mother patient's wife stated the patient was acting appropriately.

  They subsequently has no complaints, aside from bilateral knee pain.





- Related Data


 Home Medications











 Medication  Instructions  Recorded  Confirmed


 


Hydrochlorothiazide [Hydrodiuril] 25 mg PO DAILY 11/02/15 12/13/17


 


Warfarin [Coumadin] 5 mg PO [id number] 11/02/15 12/13/17


 


Aspirin EC [Ecotrin Low Dose] 81 mg PO HS 03/19/17 12/13/17


 


Atorvastatin [Lipitor] 20 mg PO HS 03/19/17 12/13/17


 


Lisinopril [Zestril] 20 mg PO DAILY 03/19/17 12/13/17


 


Memantine HCl/Donepezil HCl 1 cap PO DAILY 09/12/17 12/13/17





[Namzaric 28 mg-10 mg Capsule]   


 


Metoprolol Succinate [Toprol XL] 25 mg PO DAILY 09/12/17 12/13/17


 


Propafenone [Rythmol] 225 mg PO TID 09/12/17 12/13/17


 


Zolpidem Tartrate [Ambien] 5 mg PO HS 09/12/17 12/13/17


 


Warfarin Sodium 7.5 mg PO BRITO 12/13/17 12/13/17











 Allergies











Allergy/AdvReac Type Severity Reaction Status Date / Time


 


Sulfa (Sulfonamide Allergy  Unknown Verified 01/05/18 06:26





Antibiotics)     














Review of Systems


ROS Statement: 


Those systems with pertinent positive or pertinent negative responses have been 

documented in the HPI.





ROS Other: All systems not noted in ROS Statement are negative.





Past Medical History


Past Medical History: Atrial Fibrillation, Coronary Artery Disease (CAD), 

Hypertension


Additional Past Medical History / Comment(s): AAA, deaf in left ear, dementia


History of Any Multi-Drug Resistant Organisms: None Reported


Past Surgical History: Heart Catheterization With Stent, Hernia Repair, 

Orthopedic Surgery


Additional Past Surgical History / Comment(s): bilateral knee replacements


Additional Past Anesthesia/Blood Transfusion Reaction / Comment(s): Slow to 

wake up after anesthesia


Date of Last Stent Placement:: Unsure, >5 years


Past Psychological History: Anxiety


Smoking Status: Former smoker


Past Alcohol Use History: None Reported


Past Drug Use History: None Reported





- Past Family History


  ** Father


History Unknown: Yes


Family Medical History: Myocardial Infarction (MI)





  ** Mother


History Unknown: Yes


Family Medical History: Cancer





General Exam





- General Exam Comments


Initial Comments: 





GC GCS 15 trachea midline there was pain breath sounds are equal bilaterally,


Limitations: no limitations


General appearance: alert, in no apparent distress


Head exam: Present: atraumatic, normocephalic, normal inspection


Eye exam: Present: normal appearance, PERRL, EOMI.  Absent: scleral icterus, 

conjunctival injection, periorbital swelling


ENT exam: Present: normal exam, mucous membranes moist


Neck exam: Present: normal inspection.  Absent: tenderness, meningismus, 

lymphadenopathy


Respiratory exam: Present: normal lung sounds bilaterally.  Absent: respiratory 

distress, wheezes, rales, rhonchi, stridor


Cardiovascular Exam: Present: regular rate, normal rhythm, normal heart sounds.

  Absent: systolic murmur, diastolic murmur, rubs, gallop, clicks


GI/Abdominal exam: Present: soft, normal bowel sounds.  Absent: distended, 

tenderness, guarding, rebound, rigid


Extremities exam: Present: normal inspection, full ROM, normal capillary 

refill.  Absent: tenderness, pedal edema, joint swelling, calf tenderness


Back exam: Present: normal inspection


Neurological exam: Present: alert, oriented X3, CN II-XII intact


Psychiatric exam: Present: normal affect, normal mood


Skin exam: Present: warm, dry, intact, normal color.  Absent: rash





Course


 Vital Signs











  01/05/18 01/05/18





  06:19 06:50


 


Temperature 97.9 F 


 


Pulse Rate 79 81


 


Respiratory 18 18





Rate  


 


Blood Pressure 178/82 164/74


 


O2 Sat by Pulse 97 97





Oximetry  














- Reevaluation(s)


Reevaluation #1: 





01/05/18 07:08


patient and family spoken with. aware and questions answered.





EKG Findings





- EKG Comments:


EKG Findings:: EKG shows sinus rhythm rate of 70, pO2 40, , 





Medical Decision Making





- Medical Decision Making





87 female DEL with altered mental status as well as multiple recent falls.  

Patient was awake and alert upon arrival to emergency room, the patient does 

have positive subdural hemorrhage on Coumadin.  Patient be reversing his 

anticoagulation and transferred to, call for surgical neurosurgical evaluation





- Lab Data


Result diagrams: 


 01/05/18 06:24





 Lab Results











  01/05/18 01/05/18 01/05/18 Range/Units





  06:24 06:24 06:24 


 


WBC   8.4   (3.8-10.6)  k/uL


 


RBC   4.14 L   (4.30-5.90)  m/uL


 


Hgb   13.4   (13.0-17.5)  gm/dL


 


Hct   41.1   (39.0-53.0)  %


 


MCV   99.3   (80.0-100.0)  fL


 


MCH   32.3   (25.0-35.0)  pg


 


MCHC   32.5   (31.0-37.0)  g/dL


 


RDW   13.6   (11.5-15.5)  %


 


Plt Count   260   (150-450)  k/uL


 


PT    20.6 H  (9.0-12.0)  sec


 


INR    2.3 H  (<1.2)  


 


APTT    32.0 H  (22.0-30.0)  sec


 


Total Creatine Kinase  108    ()  U/L














- Radiology Data


Radiology results: report reviewed (CT brain and C-spine positive for acute on 

chronic subdural hematoma), image reviewed





Disposition


Clinical Impression: 


 Fall, Subdural hematoma





Disposition: OTHER INSTITUTION NOT DEFINED


Condition: Serious


Referrals: 


Chandu Wong MD [Primary Care Provider] - 1-2 days





- Out of Hospital Transfer - Req. Specs


Out of Hospital Transfer - Requested Specifics: Other Emergency Center ([facility])

## 2018-01-05 NOTE — XR
EXAMINATION TYPE: XR pelvis AP view

 

DATE OF EXAM: 1/5/2018

 

CLINICAL HISTORY: pain

 

TECHNIQUE: Single view the pelvis is submitted.

 

FINDINGS: No  evidence for fracture, dislocation or bony lesion.  Joint spaces are well-preserved.  S
I joints appear symmetric.

 

IMPRESSION: 

 

1. No acute fracture or dislocation seen.

 

ICD 10 NO FRACTURE, INITIAL EVALUATION

## 2018-01-09 LAB — GLUCOSE BLD-MCNC: 88 MG/DL (ref 75–99)
